# Patient Record
Sex: MALE | Race: WHITE | Employment: FULL TIME | URBAN - METROPOLITAN AREA
[De-identification: names, ages, dates, MRNs, and addresses within clinical notes are randomized per-mention and may not be internally consistent; named-entity substitution may affect disease eponyms.]

---

## 2017-01-04 ENCOUNTER — OFFICE VISIT (OUTPATIENT)
Dept: FAMILY MEDICINE | Facility: CLINIC | Age: 67
End: 2017-01-04
Payer: COMMERCIAL

## 2017-01-04 VITALS
SYSTOLIC BLOOD PRESSURE: 144 MMHG | BODY MASS INDEX: 30.24 KG/M2 | DIASTOLIC BLOOD PRESSURE: 86 MMHG | HEIGHT: 74 IN | TEMPERATURE: 97.7 F | WEIGHT: 235.6 LBS | OXYGEN SATURATION: 100 % | HEART RATE: 62 BPM

## 2017-01-04 DIAGNOSIS — A04.72 C. DIFFICILE DIARRHEA: ICD-10-CM

## 2017-01-04 DIAGNOSIS — R73.03 PREDIABETES: ICD-10-CM

## 2017-01-04 DIAGNOSIS — K62.5 RECTAL BLEEDING: Primary | ICD-10-CM

## 2017-01-04 DIAGNOSIS — R97.20 ELEVATED PROSTATE SPECIFIC ANTIGEN (PSA): ICD-10-CM

## 2017-01-04 DIAGNOSIS — R39.11 URINARY HESITANCY: ICD-10-CM

## 2017-01-04 LAB
ERYTHROCYTE [DISTWIDTH] IN BLOOD BY AUTOMATED COUNT: 15 % (ref 10–15)
HBA1C MFR BLD: 6.3 % (ref 4.3–6)
HCT VFR BLD AUTO: 36.6 % (ref 40–53)
HGB BLD-MCNC: 12.3 G/DL (ref 13.3–17.7)
MCH RBC QN AUTO: 29.9 PG (ref 26.5–33)
MCHC RBC AUTO-ENTMCNC: 33.6 G/DL (ref 31.5–36.5)
MCV RBC AUTO: 89 FL (ref 78–100)
PLATELET # BLD AUTO: 279 10E9/L (ref 150–450)
RBC # BLD AUTO: 4.12 10E12/L (ref 4.4–5.9)
WBC # BLD AUTO: 4.9 10E9/L (ref 4–11)

## 2017-01-04 PROCEDURE — 85027 COMPLETE CBC AUTOMATED: CPT | Performed by: FAMILY MEDICINE

## 2017-01-04 PROCEDURE — 99214 OFFICE O/P EST MOD 30 MIN: CPT | Performed by: FAMILY MEDICINE

## 2017-01-04 PROCEDURE — 83036 HEMOGLOBIN GLYCOSYLATED A1C: CPT | Performed by: FAMILY MEDICINE

## 2017-01-04 PROCEDURE — G0103 PSA SCREENING: HCPCS | Performed by: FAMILY MEDICINE

## 2017-01-04 PROCEDURE — 36415 COLL VENOUS BLD VENIPUNCTURE: CPT | Performed by: FAMILY MEDICINE

## 2017-01-04 RX ORDER — TAMSULOSIN HYDROCHLORIDE 0.4 MG/1
0.4 CAPSULE ORAL DAILY
Qty: 30 CAPSULE | Refills: 1 | Status: SHIPPED | OUTPATIENT
Start: 2017-01-04 | End: 2017-02-09

## 2017-01-04 NOTE — MR AVS SNAPSHOT
After Visit Summary   1/4/2017    Juwan Butt    MRN: [id number]           Patient Information     Date Of Birth          1950        Visit Information        Provider Department      1/4/2017 4:00 PM Liliana Howard MD [facility]        Today's Diagnoses     Rectal bleeding    -  1     Need for prophylactic vaccination and inoculation against influenza         C. difficile diarrhea         Urinary hesitancy         Prediabetes           Care Instructions    Based on your medical history and these are the current health maintenance or preventive care services that you are due for (some may have been done at this visit)  Health Maintenance Due   Topic Date Due     HEPATITIS C SCREENING  12/12/1970     INFLUENZA VACCINE (SYSTEM ASSIGNED)  09/01/2016       At [facility], we strive to deliver an exceptional experience to you, every time we see you.    If you receive a survey in the mail, please send us back your thoughts. We really do value your feedback.    Your care team's suggested websites for health information:  Www.GOOM.Lifeables : Up to date and easily searchable information on multiple topics.  Www.medlineplus.gov : medication info, interactive tutorials, watch real surgeries online  Www.familydoctor.org : good info from the Academy of Family Physicians  Www.cdc.gov : public health info, travel advisories, epidemics (H1N1)  Www.aap.org : children's health info, normal development, vaccinations  Www.health.state[place] : [place] dept of health, public health issues in [place], N1N1    How to contact your care team:   Team Luci/Spirit [phone]         Pharmacy [phone]    Dr. Rossi, Brenda Casillas PA-C, Tessa Licona APRN CNP, Batsheva De La Torre PA-C, Dr. Patton, and Gail Rausch, YULIYA    Team RNs: Cecille & Norma      Clinic hours  M-Th 7 am-7 pm   Fri 7 am-5 pm.   Urgent care M-F 11 am-9 pm,   Sat/Sun 9 am-5  "pm.  Pharmacy M-Th 8 am-8 pm Fri 8 am-6 pm  Sat/Sun 9 am-5 pm.     All password changes, disabled accounts, or ID changes in Mailbox/MyHealth will be done by our Access Services Department.    If you need help with your account or password, call: [phone]. Clinic staff no longer has the ability to change passwords.             Follow-ups after your visit        Your next 10 appointments already scheduled     Burton 10, 2017  4:00 PM   (Arrive by 3:45 PM)   Return Visit with Jaron Alan MD   Colon and Rectal Surgery ([facility])    [address]  [address] [facility] [address] [phone]              Who to contact     If you have questions or need follow up information about today's clinic visit or your schedule please contact [facility] CHRISTINE directly at [phone].  Normal or non-critical lab and imaging results will be communicated to you by Neuravihart, letter or phone within 4 business days after the clinic has received the results. If you do not hear from us within 7 days, please contact the clinic through [facility] or phone. If you have a critical or abnormal lab result, we will notify you by phone as soon as possible.  Submit refill requests through Mailbox or call your pharmacy and they will forward the refill request to us. Please allow 3 business days for your refill to be completed.          Additional Information About Your Visit        Mailbox Information     Mailbox lets you send messages to your doctor, view your test results, renew your prescriptions, schedule appointments and more. To sign up, go to [email or web address] . Click on \"Log in\" on the left side of the screen, which will take you to the Welcome page. Then click on \"Sign up Now\" on the right side of the page.     You will be asked to enter the access code listed below, as well as some personal information. Please follow the directions to create your username and password.   " "  Your access code is: [email or web address]  Expires: 2017  9:05 AM     Your access code will  in 90 days. If you need help or a new code, please call your [facility] or [phone].        Care EveryWhere ID     This is your Care EveryWhere ID. This could be used by other organizations to access your [facility] medical records  [id number]        Your Vitals Were     Pulse Temperature Height BMI (Body Mass Index) Pulse Oximetry       62 97.7  F (36.5  C) (Oral) 6' 1.62\" (1.87 m) 30.56 kg/m2 100%        Blood Pressure from Last 3 Encounters:   17 144/86   16 119/56   16 126/84    Weight from Last 3 Encounters:   17 235 lb 9.6 oz (106.867 kg)   16 221 lb (100.245 kg)   16 235 lb 4.8 oz (106.731 kg)              We Performed the Following     CBC with platelets     Hemoglobin A1c     PSA, screen          Today's Medication Changes          These changes are accurate as of: 17  4:21 PM.  If you have any questions, ask your nurse or doctor.               Start taking these medicines.        Dose/Directions    tamsulosin 0.4 MG capsule   Commonly known as:  FLOMAX   Used for:  Urinary hesitancy   Started by:  Liliana Howard MD        Dose:  0.4 mg   Take 1 capsule (0.4 mg) by mouth daily   Quantity:  30 capsule   Refills:  1            Where to get your medicines      These medications were sent to [facility] [id number] [place], [place] - [address]  [address] [facility] [address]     Phone:  [phone]    - tamsulosin 0.4 MG capsule             Primary Care Provider Office Phone # Fax #    Liliana Sam [phone]       [facility] [address]  [facility] [address]        Thank you!     Thank you for choosing [facility]  for your care. Our goal is always to provide you with excellent care. Hearing back from our patients is one way we can continue to " improve our services. Please take a few minutes to complete the written survey that you may receive in the mail after your visit with us. Thank you!             Your Updated Medication List - Protect others around you: Learn how to safely use, store and throw away your medicines at www.disposemymeds.org.          This list is accurate as of: 1/4/17  4:21 PM.  Always use your most recent med list.                   Brand Name Dispense Instructions for use    bacitracin ointment     113.4 g    Apply topically 2 times daily       metroNIDAZOLE 500 MG tablet    FLAGYL    40 tablet    Take 1 tablet (500 mg) by mouth every 8 hours Take first pill at 9PM on 12/21/2016.       tamsulosin 0.4 MG capsule    FLOMAX    30 capsule    Take 1 capsule (0.4 mg) by mouth daily

## 2017-01-04 NOTE — PATIENT INSTRUCTIONS
Based on your medical history and these are the current health maintenance or preventive care services that you are due for (some may have been done at this visit)  Health Maintenance Due   Topic Date Due     HEPATITIS C SCREENING  12/12/1970     INFLUENZA VACCINE (SYSTEM ASSIGNED)  09/01/2016       At [facility], we strive to deliver an exceptional experience to you, every time we see you.    If you receive a survey in the mail, please send us back your thoughts. We really do value your feedback.    Your care team's suggested websites for health information:  Www.[facility]and.org : Up to date and easily searchable information on multiple topics.  Www.medlineplus.gov : medication info, interactive tutorials, watch real surgeries online  Www.familydoctor.org : good info from the Academy of Family Physicians  Www.cdc.gov : public health info, travel advisories, epidemics (H1N1)  Www.aap.org : children's health info, normal development, vaccinations  Www.health.[facility].[place].us : [place] dept of health, public health issues in [place], N1N1    How to contact your care team:   Stefanie Verma/Pato [phone]         Pharmacy [phone]    Dr. Rossi, Brenda Casillas PA-C, Dr. Healy, Tessa CRISTINA CNP, Batsheva De La Torre PA-C, Dr. Patton, and Gail Rausch CNP    Team RNs: Cecille & Norma      Clinic hours  M-Th 7 am-7 pm   Fri 7 am-5 pm.   Urgent care M-F 11 am-9 pm,   Sat/Sun 9 am-5 pm.  Pharmacy M-Th 8 am-8 pm Fri 8 am-6 pm  Sat/Sun 9 am-5 pm.     All password changes, disabled accounts, or ID changes in Doostang/MyHealth will be done by our Access Services Department.    If you need help with your account or password, call: [phone]. Clinic staff no longer has the ability to change passwords.

## 2017-01-04 NOTE — PROGRESS NOTES
SUBJECTIVE:                                                    Juwan Butt is a 66 year old male who presents to clinic today for the following health issues:          Hospital Follow-up Visit:    Hospital/Nursing Home/IP Rehab Facility: [facility] transfered to [facility]  Date of Admission: 12/19/2016  Date of Discharge: 12/21/2016  Reason(s) for Admission: Rectal Bleeding             Problems taking medications regularly:  None       Medication changes since discharge: None       Problems adhering to non-medication therapy:  None    Summary of hospitalization:  CareEverywhere information obtained and reviewed  Diagnostic Tests/Treatments reviewed.  Follow up needed: none  Other Healthcare Providers Involved in Patient s Care:         None  Update since discharge: improved.     Post Discharge Medication Reconciliation: discharge medications reconciled, continue medications without change.  Plan of care communicated with patient     Coding guidelines for this visit:  Type of Medical   Decision Making Face-to-Face Visit       within 7 Days of discharge Face-to-Face Visit        within 14 days of discharge   Moderate Complexity [id number]   High Complexity [id number]          12/7/16 had rectal polyp removed and was non cancerous.  Has had trouble urinating since then.  12/18/16 had BM with large amount of blood.  This continue hourly and then he passed out and hit face on fair with cut on right check.  Woke and passed out again.  Had another bloody BM and passed out for the third time.  Felt better and showered and then was better.  911 was called and taken to [facility].  Thought this was related to rectal procedure and was reassured.  Moved to [facility] and diarrhea began again and diagnosed with C Dif.  He was placed on metronidazole and last dose was today.    History of prediabetes - has not been watching diet closely given the holidays.    Problem list and histories reviewed &  "adjusted, as indicated.  Additional history: as documented    Problem list, Medication list, Allergies, and Medical/Social/Surgical histories reviewed in [facility] and updated as appropriate.    ROS:  Constitutional, HEENT, cardiovascular, pulmonary, gi and gu systems are negative, except as otherwise noted.    OBJECTIVE:                                                    /86 mmHg  Pulse 62  Temp(Src) 97.7  F (36.5  C) (Oral)  Ht 6' 1.62\" (1.87 m)  Wt 235 lb 9.6 oz (106.867 kg)  BMI 30.56 kg/m2  SpO2 100%  Body mass index is 30.56 kg/(m^2).  GENERAL: healthy, alert and no distress  NECK: no adenopathy, no asymmetry, masses, or scars and thyroid normal to palpation  RESP: lungs clear to auscultation - no rales, rhonchi or wheezes  CV: regular rate and rhythm, normal S1 S2, no S3 or S4, no murmur, click or rub, no peripheral edema and peripheral pulses strong  ABDOMEN: soft, nontender, no hepatosplenomegaly, no masses and bowel sounds normal  MS: no gross musculoskeletal defects noted, no edema    Diagnostic Test Results:  Results for orders placed or performed in visit on 01/04/17 (from the past 24 hour(s))   Hemoglobin A1c   Result Value Ref Range    Hemoglobin A1C 6.3 (H) 4.3 - 6.0 %   CBC with platelets   Result Value Ref Range    WBC 4.9 4.0 - 11.0 10e9/L    RBC Count 4.12 (L) 4.4 - 5.9 10e12/L    Hemoglobin 12.3 (L) 13.3 - 17.7 g/dL    Hematocrit 36.6 (L) 40.0 - 53.0 %    MCV 89 78 - 100 fl    MCH 29.9 26.5 - 33.0 pg    MCHC 33.6 31.5 - 36.5 g/dL    RDW 15.0 10.0 - 15.0 %    Platelet Count 279 150 - 450 10e9/L        ASSESSMENT/PLAN:                                                    1. Rectal bleeding  Resolved and anemia improved  - CBC with platelets    2. C. difficile diarrhea  Resolved - monitor for more diarrhea    3. Urinary hesitancy  Uncertain etiology - check PSA and trial of flomax for 2 weeks.  Follow up if symptoms not improved or if they recur.  - PSA, screen  - tamsulosin (FLOMAX) 0.4 MG " capsule; Take 1 capsule (0.4 mg) by mouth daily  Dispense: 30 capsule; Refill: 1    4. Prediabetes  Stable - low carb diet again discussed.  - Hemoglobin A1c    The uses and side effects, including black box warnings as appropriate, were discussed in detail.  All patient questions were answered.  The patient was instructed to call immediately if any side effects developed.     Liliana Sam MD  [facility]

## 2017-01-04 NOTE — NURSING NOTE
"Chief Complaint   Patient presents with     Hospital F/U       Initial /88 mmHg  Pulse 48  Temp(Src) 97.7  F (36.5  C) (Oral)  Ht 6' 1.62\" (1.87 m)  Wt 235 lb 9.6 oz (106.867 kg)  BMI 30.56 kg/m2  SpO2 100% Estimated body mass index is 30.56 kg/(m^2) as calculated from the following:    Height as of this encounter: 6' 1.62\" (1.87 m).    Weight as of this encounter: 235 lb 9.6 oz (106.867 kg).  BP completed using cuff size: philip Abernathy MA      "

## 2017-01-05 LAB — PSA SERPL-ACNC: 4.74 UG/L (ref 0–4)

## 2017-01-06 ENCOUNTER — TELEPHONE (OUTPATIENT)
Dept: FAMILY MEDICINE | Facility: CLINIC | Age: 67
End: 2017-01-06

## 2017-01-06 PROBLEM — R97.20 ELEVATED PROSTATE SPECIFIC ANTIGEN (PSA): Status: ACTIVE | Noted: 2017-01-06

## 2017-01-06 NOTE — TELEPHONE ENCOUNTER
Notes Recorded by Liliana Howard MD on 1/6/2017 at 2:32 PM  Please call.    Prostate blood test is slightly high. Please use the flomax given at his appt and follow up in 1 month for recheck of this lab.  He is still prediabetic based on his labs so work on cutting back in bread, sweets and beer consistently.  Hemoglobin is improving.    Liliana oRssi M.D.    This writer attempted to contact Juwan on 01/06/2017.    Was call answered?  No.  Unable to leave message.    If patient calls back, please Contact Clinic RN team. If no one available, send encounter message    Cecille Sanabria RN

## 2017-01-06 NOTE — PROGRESS NOTES
Quick Note:    Please call.    Prostate blood test is slightly high. Please use the flomax given at his appt and follow up in 1 month for recheck of this lab.  He is still prediabetic based on his labs so work on cutting back in bread, sweets and beer consistently.  Hemoglobin is improving.    Liliana Rossi M.D.  ______

## 2017-01-09 NOTE — TELEPHONE ENCOUNTER
Patient was told to take for 2 weeks then stop it.  If symptoms are not improved while taking and while off the medication after he stops it, let me know.

## 2017-01-09 NOTE — TELEPHONE ENCOUNTER
Patient called back and was informed of the information below.    Norma Saunders RN, [facility] Triage

## 2017-01-09 NOTE — TELEPHONE ENCOUNTER
Called the patient with the information below. Is he to take the flomax for 2 weeks only and then recheck it in another 2 weeks? Clarification needed on how to proceed.    Norma Saunders RN, [facility] Triage

## 2017-01-09 NOTE — TELEPHONE ENCOUNTER
This writer attempted to contact Juwan on 01/09/2017.    Was call answered?  No.  Left message on voicemail with information to call me back.    If patient calls back, please Contact Clinic RN team. If no one available, send encounter message.    Norma Saunders RN

## 2017-02-08 ENCOUNTER — TELEPHONE (OUTPATIENT)
Dept: FAMILY MEDICINE | Facility: CLINIC | Age: 67
End: 2017-02-08

## 2017-02-08 DIAGNOSIS — N52.8 OTHER MALE ERECTILE DYSFUNCTION: Primary | ICD-10-CM

## 2017-02-08 NOTE — TELEPHONE ENCOUNTER
"..Reason for Call:  Pt calling to update after taking flomax for two weeks    Detailed comments: the urinary problem is pretty much resolved, states,  \"I'm as good as I was before the procedure\"  The ED problem is not resolved, asking for a medication to help with this.      [facility] -[phone]    Phone Number Patient can be reached at: Cell number on file:    Telephone Information:   Mobile [phone]       Best Time: anytime    Can we leave a detailed message on this number? YES    Call taken on 2/8/2017 at 2:25 PM by Josefa Rouse      "

## 2017-02-09 PROBLEM — N52.8 OTHER MALE ERECTILE DYSFUNCTION: Status: ACTIVE | Noted: 2017-02-09

## 2017-02-09 RX ORDER — SILDENAFIL 50 MG/1
25-50 TABLET, FILM COATED ORAL DAILY PRN
Qty: 6 TABLET | Refills: 1 | Status: SHIPPED
Start: 2017-02-09 | End: 2017-07-25

## 2017-02-09 NOTE — TELEPHONE ENCOUNTER
Great to hear things are better with the urinary problem.  Dr. Rossi is out of the office until Monday, I'll forward this message along to her to address the ED problem upon her return.     Batsheva De La Torre PA-C

## 2017-02-23 ENCOUNTER — OFFICE VISIT (OUTPATIENT)
Dept: URGENT CARE | Facility: URGENT CARE | Age: 67
End: 2017-02-23
Payer: COMMERCIAL

## 2017-02-23 VITALS
BODY MASS INDEX: 29.19 KG/M2 | HEART RATE: 51 BPM | OXYGEN SATURATION: 97 % | SYSTOLIC BLOOD PRESSURE: 190 MMHG | WEIGHT: 225 LBS | DIASTOLIC BLOOD PRESSURE: 90 MMHG | TEMPERATURE: 97 F

## 2017-02-23 DIAGNOSIS — H60.391 OTHER INFECTIVE ACUTE OTITIS EXTERNA OF RIGHT EAR: Primary | ICD-10-CM

## 2017-02-23 DIAGNOSIS — H92.01 RIGHT EAR PAIN: ICD-10-CM

## 2017-02-23 PROCEDURE — 99213 OFFICE O/P EST LOW 20 MIN: CPT | Performed by: PHYSICIAN ASSISTANT

## 2017-02-23 RX ORDER — CIPROFLOXACIN AND DEXAMETHASONE 3; 1 MG/ML; MG/ML
4 SUSPENSION/ DROPS AURICULAR (OTIC) 2 TIMES DAILY
Qty: 7.5 ML | Refills: 0 | Status: SHIPPED | OUTPATIENT
Start: 2017-02-23 | End: 2017-03-02

## 2017-02-23 NOTE — PROGRESS NOTES
"SUBJECTIVE:    Juwan Butt presents to  today for evaluation of right ear pain x 24 hours. Patient notes onset of pain came after wife helped him remove cerumen out of right ear by use of and ear \"candle\" and by using a hair pin/farrah pin.     Denies any fever, nasal congestion, cough or any other acute URI sxs.     CV: Elevated BP of 190/90 noted here today.  Patient states he has a longstanding hx of having similar readings that have been dx as white coat HTN. Review of Epic confirms same. He denies any CP, SOB, headache or other CV sxs.     Past Medical History   Diagnosis Date     First degree AV block 1/6/2016     Hypertension      white coat syndrome     Rectal polyp        Current Outpatient Prescriptions:      sildenafil (REVATIO/VIAGRA) 50 MG cap/tab, Take 0.5-1 tablets (25-50 mg) by mouth daily as needed for erectile dysfunction Take 30 min to 4 hours before intercourse.  Never use with nitroglycerin, terazosin or doxazosin. (Patient not taking: Reported on 2/23/2017), Disp: 6 tablet, Rfl: 1     bacitracin ointment, Apply topically 2 times daily (Patient not taking: Reported on 2/23/2017), Disp: 113.4 g, Rfl: 3     metroNIDAZOLE (FLAGYL) 500 MG tablet, Take 1 tablet (500 mg) by mouth every 8 hours Take first pill at 9PM on 12/21/2016. (Patient not taking: Reported on 2/23/2017), Disp: 40 tablet, Rfl: 0  No current facility-administered medications for this visit.     Facility-Administered Medications Ordered in Other Visits:      ceFAZolin 1000 mg in NaCl 1000 mL, 1 g, , PRN, Tom Odom MD, 1 g at 01/13/16 1100    No Known Allergies        OBJECTIVE:  /90 (BP Location: Right arm, Patient Position: Chair, Cuff Size: Adult Large)  Pulse 51  Temp 97  F (36.1  C) (Tympanic)  Wt 225 lb (102.1 kg)  SpO2 97%  BMI 29.19 kg/m2      General appearance: alert and no apparent distress  Skin color is pink and without rash.  HEENT:   Conjunctiva not injected.  Sclera clear.  Left TM is normal: no " "effusions, no erythema, and normal landmarks.  Right external canal is erythematous, moderately edematous and filled with purulent debris. I am unable to see TM today  Nasal mucosa is normal.  Oropharyngeal exam is normal: no lesions, erythema, adenopathy or exudate.  Neck is supple with no adenopathy  CARDIAC:NORMAL - regular rate and rhythm without murmur.  RESP: Normal - CTA without rales, rhonchi, or wheezing.      ASSESSMENT/PLAN:      (H60.391) Other infective acute otitis externa of right ear  (primary encounter diagnosis)  Plan: ciprofloxacin-dexamethasone (CIPRODEX) otic         suspension  Abx as per above.      Home comfort care measures, expected course of illness, expected response to treatment provided here today and red flag signs and sxs all discussed. Follow-up with PCP if sxs change, worsen or fail to fully resolve with above tx. In addition to the above, otitis externa \"red flag\" signs and sxs are reviewed with pt both verbally and by way of printed educational material for home review.  Pt verbalizes understanding of and agrees to the above plan.         (H92.01) Right ear pain  Plan: as per above     (I10) Elevated BP  Comment: Patient states today's reading is typical for him in clinic setting   Plan: Continue to follow-up with PCP as directed regarding significantly elevated BP readings in office setting.         "

## 2017-02-23 NOTE — PATIENT INSTRUCTIONS
External Ear Infection (Adult)    External otitis (also called  swimmer s ear ) is an infection in the ear canal. It is often caused by bacteria or fungus. It can occur a few days after water gets trapped in the ear canal (from swimming or bathing). It can also occur after cleaning too deeply in the ear canal with a cotton swab or other object. Sometimes, hair care products get into the ear canal and cause this problem.  Symptoms can include pain, fever, itching, redness, drainage, or swelling of the ear canal. Temporary hearing loss may also occur.  Home care    Do not try to clean the ear canal. This can push pus and bacteria deeper into the canal.    Use prescribed ear drops as directed. These help reduce swelling and fight the infection. If an ear wick was placed in the ear canal, apply drops right onto the end of the wick. The wick will draw the medication into the ear canal even if it is swollen closed.    A cotton ball may be loosely placed in the outer ear to absorb any drainage.    You may use acetaminophen or ibuprofen to control pain, unless another medication was prescribed. Note: If you have chronic liver or kidney disease or ever had a stomach ulcer or GI bleeding, talk to your health care provider before taking any of these medications.    Do not allow water to get into your ear when bathing. Also, avoid swimming until the infection has cleared.  Prevention    Keep your ears dry. This helps lower the risk of infection. Dry your ears with a towel or hair dryer after getting wet. Also, use ear plugs when swimming.    Do not stick any objects in the ear to remove wax.    If you feel water trapped in your ear, use ear drops right away. You can get these drops over the counter at most drugstores.  They work by removing water from the ear canal.  Follow-up care  Follow up with your health care provider in one week, or as advised.   When to seek medical advice  Call your health care provider right away if  any of these occur:    Ear pain becomes worse or doesn t improve after 3 days of treatment    Redness or swelling of the outer ear occurs or gets worse    Headache    Painful or stiff neck    Drowsiness or confusion    Fever of 100.4 F (38 C) or higher, or as directed by your health care provider    Seizure        9057-7525 [employer]. [address] [place] [address]. All rights reserved. This information is not intended as a substitute for professional medical care. Always follow your healthcare professional's instructions.

## 2017-02-23 NOTE — MR AVS SNAPSHOT
After Visit Summary   2/23/2017    Juwan Butt    MRN: [id number]           Patient Information     Date Of Birth          1950        Visit Information        Provider Department      2/23/2017 12:00 PM Prasanna Carmichael PA-C [facility]        Today's Diagnoses     Other infective acute otitis externa of right ear    -  1    Right ear pain        Elevated BP          Care Instructions      External Ear Infection (Adult)    External otitis (also called  swimmer s ear ) is an infection in the ear canal. It is often caused by bacteria or fungus. It can occur a few days after water gets trapped in the ear canal (from swimming or bathing). It can also occur after cleaning too deeply in the ear canal with a cotton swab or other object. Sometimes, hair care products get into the ear canal and cause this problem.  Symptoms can include pain, fever, itching, redness, drainage, or swelling of the ear canal. Temporary hearing loss may also occur.  Home care    Do not try to clean the ear canal. This can push pus and bacteria deeper into the canal.    Use prescribed ear drops as directed. These help reduce swelling and fight the infection. If an ear wick was placed in the ear canal, apply drops right onto the end of the wick. The wick will draw the medication into the ear canal even if it is swollen closed.    A cotton ball may be loosely placed in the outer ear to absorb any drainage.    You may use acetaminophen or ibuprofen to control pain, unless another medication was prescribed. Note: If you have chronic liver or kidney disease or ever had a stomach ulcer or GI bleeding, talk to your health care provider before taking any of these medications.    Do not allow water to get into your ear when bathing. Also, avoid swimming until the infection has cleared.  Prevention    Keep your ears dry. This helps lower the risk of infection. Dry your ears with a towel or hair dryer  after getting wet. Also, use ear plugs when swimming.    Do not stick any objects in the ear to remove wax.    If you feel water trapped in your ear, use ear drops right away. You can get these drops over the counter at most drugstores.  They work by removing water from the ear canal.  Follow-up care  Follow up with your health care provider in one week, or as advised.   When to seek medical advice  Call your health care provider right away if any of these occur:    Ear pain becomes worse or doesn t improve after 3 days of treatment    Redness or swelling of the outer ear occurs or gets worse    Headache    Painful or stiff neck    Drowsiness or confusion    Fever of 100.4 F (38 C) or higher, or as directed by your health care provider    Seizure        2756-7835 [employer]. [address] [place] [address]. All rights reserved. This information is not intended as a substitute for professional medical care. Always follow your healthcare professional's instructions.              Follow-ups after your visit        Who to contact     If you have questions or need follow up information about today's clinic visit or your schedule please contact [facility] directly at [phone].  Normal or non-critical lab and imaging results will be communicated to you by Greysoxhart, letter or phone within 4 business days after the clinic has received the results. If you do not hear from us within 7 days, please contact the clinic through Greysoxhart or phone. If you have a critical or abnormal lab result, we will notify you by phone as soon as possible.  Submit refill requests through Vdancer or call your pharmacy and they will forward the refill request to us. Please allow 3 business days for your refill to be completed.          Additional Information About Your Visit        Greysoxhart Information     Vdancer lets you send messages to your doctor, view your test results, renew your prescriptions,  "schedule appointments and more. To sign up, go to [email or web address] . Click on \"Log in\" on the left side of the screen, which will take you to the Welcome page. Then click on \"Sign up Now\" on the right side of the page.     You will be asked to enter the access code listed below, as well as some personal information. Please follow the directions to create your username and password.     Your access code is: [email or web address]  Expires: 2017 12:17 PM     Your access code will  in 90 days. If you need help or a new code, please call your [facility] clinic or [phone].        Care EveryWhere ID     This is your Care EveryWhere ID. This could be used by other organizations to access your [facility] medical records  [id number]        Your Vitals Were     Pulse Temperature Pulse Oximetry BMI (Body Mass Index)          51 97  F (36.1  C) (Tympanic) 97% 29.19 kg/m2         Blood Pressure from Last 3 Encounters:   17 190/90   17 144/86   16 119/56    Weight from Last 3 Encounters:   17 225 lb (102.1 kg)   17 235 lb 9.6 oz (106.9 kg)   16 221 lb (100.2 kg)              Today, you had the following     No orders found for display         Today's Medication Changes          These changes are accurate as of: 17 12:18 PM.  If you have any questions, ask your nurse or doctor.               Start taking these medicines.        Dose/Directions    ciprofloxacin-dexamethasone otic suspension   Commonly known as:  CIPRODEX   Used for:  Other infective acute otitis externa of right ear   Started by:  Prasanna Carmichael PA-C        Dose:  4 drop   Place 4 drops into the right ear 2 times daily for 7 days   Quantity:  7.5 mL   Refills:  0            Where to get your medicines      These medications were sent to [facility] - [address]   [address]  [address], [place] [address]     Phone:  [phone]     " ciprofloxacin-dexamethasone otic suspension                Primary Care Provider Office Phone # Fax #    Liliana Ingris Sam [phone]       [facility] [address]  [facility] [address]        Thank you!     Thank you for choosing [facility]  for your care. Our goal is always to provide you with excellent care. Hearing back from our patients is one way we can continue to improve our services. Please take a few minutes to complete the written survey that you may receive in the mail after your visit with us. Thank you!             Your Updated Medication List - Protect others around you: Learn how to safely use, store and throw away your medicines at www.disposemymeds.org.          This list is accurate as of: 2/23/17 12:18 PM.  Always use your most recent med list.                   Brand Name Dispense Instructions for use    bacitracin ointment     113.4 g    Apply topically 2 times daily       ciprofloxacin-dexamethasone otic suspension    CIPRODEX    7.5 mL    Place 4 drops into the right ear 2 times daily for 7 days       metroNIDAZOLE 500 MG tablet    FLAGYL    40 tablet    Take 1 tablet (500 mg) by mouth every 8 hours Take first pill at 9PM on 12/21/2016.       sildenafil 50 MG cap/tab    REVATIO/VIAGRA    6 tablet    Take 0.5-1 tablets (25-50 mg) by mouth daily as needed for erectile dysfunction Take 30 min to 4 hours before intercourse.  Never use with nitroglycerin, terazosin or doxazosin.

## 2017-07-25 ENCOUNTER — OFFICE VISIT (OUTPATIENT)
Dept: SURGERY | Facility: CLINIC | Age: 67
End: 2017-07-25

## 2017-07-25 VITALS
WEIGHT: 239.9 LBS | SYSTOLIC BLOOD PRESSURE: 161 MMHG | OXYGEN SATURATION: 96 % | TEMPERATURE: 98 F | HEART RATE: 47 BPM | HEIGHT: 73 IN | DIASTOLIC BLOOD PRESSURE: 89 MMHG | BODY MASS INDEX: 31.8 KG/M2

## 2017-07-25 DIAGNOSIS — K62.5 RECTAL BLEEDING: Primary | ICD-10-CM

## 2017-07-25 DIAGNOSIS — K62.1 RECTAL POLYP: ICD-10-CM

## 2017-07-25 ASSESSMENT — PAIN SCALES - GENERAL: PAINLEVEL: NO PAIN (0)

## 2017-07-25 NOTE — MR AVS SNAPSHOT
After Visit Summary   7/25/2017    Juwan Butt    MRN: [id number]           Patient Information     Date Of Birth          1950        Visit Information        Provider Department      7/25/2017 3:00 PM Jaron Alan MD [facility] Colon and Rectal Surgery        Today's Diagnoses     Rectal bleeding    -  1    Rectal polyp          Care Instructions    RFV: Routine follow up of tubulovillous adenoma s/p TAMIS    HPI: Mr. 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  He has been doing well in the interim.  No changes in bowel habits.  No rectal bleeding or rectal pain.  No abdominal pain or weight loss.      PE:  AF VSS  Awake and alert  RRR  CTA b/l  Soft, non tender, non distended, no HSM  Perianal skin: no fissure or fistulas, dry perineum, no excoriation, no masses  CHRIS: Normal tone, slightly enlarged prostate, no masses  No c/c/e    Flex Sigmoidoscopy: After informed consent was obtained the flexible sigmoidoscope was introduced via the anus.  This was advanced to 30 cm.  The sigmoid and proximal rectum were normal.  There was a healed scar in the distal rectum corresponding to the site of the resection.  There was a small lesion at the periphery of the scar.  This was biopsied with a cold forceps.  Hemostasis was excellent. Retroflexion was not performed.  The anal canal was normal.    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 Alan              Follow-ups after your visit        Who to contact     Please call your clinic at [phone] to:    Ask questions about your health    Make or cancel appointments    Discuss your medicines    Learn about your test  "results    Speak to your doctor   If you have compliments or concerns about an experience at your clinic, or if you wish to file a complaint, please contact [facility] Patient Relations at [phone] or email us at [email or web address]         Additional Information About Your Visit        [employer] International[employer] Information     Hele Mass[employer] is an electronic gateway that provides easy, online access to your medical records. With Ubersense, you can request a clinic appointment, read your test results, renew a prescription or communicate with your care team.     To sign up for U[employer]ense visit the website at [email or web address]   You will be asked to enter the access code listed below, as well as some personal information. Please follow the directions to create your username and password.     Your access code is: [email or web address]  Expires: 2017  3:03 PM     Your access code will  in 90 days. If you need help or a new code, please contact your [facility] or call [phone] for assistance.        Care EveryWhere ID     This is your Care EveryWhere ID. This could be used by other organizations to access your [facility] medical records  [id number]        Your Vitals Were     Pulse Temperature Height Pulse Oximetry BMI (Body Mass Index)       47 98  F (36.7  C) (Oral) 1.854 m (6' 1\") 96% 31.65 kg/m2        Blood Pressure from Last 3 Encounters:   17 161/89   17 190/90   17 144/86    Weight from Last 3 Encounters:   17 108.8 kg (239 lb 14.4 oz)   17 102.1 kg (225 lb)   17 106.9 kg (235 lb 9.6 oz)              We Performed the Following     Surgical pathology exam        Primary Care Provider Office Phone # Fax #    Liliana Sam [phone]       [facility] [address]  [facility] [address]        Equal Access to Services     DARIUSZ PEREZ AH: Citlalli Cadena, " martha chaudhry, berenicemaritza diasbrian francagwendolyn, luis manuel pakoin hayaan candacegene frankiebang laGabieleazar ah. So [facility] [phone].    ATENCIÓN: Si habla español, tiene a barnes disposición servicios gratuitos de asistencia lingüística. Llame al [phone].    We comply with applicable federal civil rights laws and [place] laws. We do not discriminate on the basis of race, color, national origin, age, disability sex, sexual orientation or gender identity.            Thank you!     Thank you for choosing [facility] COLON AND RECTAL SURGERY  for your care. Our goal is always to provide you with excellent care. Hearing back from our patients is one way we can continue to improve our services. Please take a few minutes to complete the written survey that you may receive in the mail after your visit with us. Thank you!             Your Updated Medication List - Protect others around you: Learn how to safely use, store and throw away your medicines at www.disposemymeds.org.      Notice  As of 7/25/2017 11:59 PM    You have not been prescribed any medications.

## 2017-07-25 NOTE — NURSING NOTE
"Chief Complaint   Patient presents with     Clinic Care Coordination - Follow-up     Flexible sigmoidoscopy.       Vitals:    07/25/17 1457   BP: 161/89   BP Location: Left arm   Patient Position: Chair   Cuff Size: Adult Large   Pulse: (!) 47   Temp: 98  F (36.7  C)   TempSrc: Oral   SpO2: 96%   Weight: 239 lb 14.4 oz   Height: 6' 1\"       Body mass index is 31.65 kg/(m^2).    Azar ALFARO LPN                     "

## 2017-07-25 NOTE — NURSING NOTE
RFV: Routine follow up of tubulovillous adenoma s/p TAMIS    HPI: Mr. 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  He has been doing well in the interim.  No changes in bowel habits.  No rectal bleeding or rectal pain.  No abdominal pain or weight loss.      PE:  AF VSS  Awake and alert  RRR  CTA b/l  Soft, non tender, non distended, no HSM  Perianal skin: no fissure or fistulas, dry perineum, no excoriation, no masses  CHRIS: Normal tone, slightly enlarged prostate, no masses  No c/c/e    Flex Sigmoidoscopy: After informed consent was obtained the flexible sigmoidoscope was introduced via the anus.  This was advanced to 30 cm.  The sigmoid and proximal rectum were normal.  There was a healed scar in the distal rectum corresponding to the site of the resection.  There was a small lesion at the periphery of the scar.  This was biopsied with a cold forceps.  Hemostasis was excellent. Retroflexion was not performed.  The anal canal was normal.    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 Alan

## 2017-07-25 NOTE — LETTER
7/25/2017       RE: Juwan Butt  [address] [place] [address]     Dear Colleague,    Thank you for referring your patient, Juwan Butt, to the [facility] COLON AND RECTAL SURGERY at [facility]. Please see a copy of my visit note below.    The following is my clinic note of 7/25/2017, incorrectly filed by Epic under nursing note:    RFV: Routine follow up of tubulovillous adenoma s/p TAMIS     HPI: Mr. 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  He has been doing well in the interim.  No changes in bowel habits.  No rectal bleeding or rectal pain.  No abdominal pain or weight loss.       PE:  AF VSS  Awake and alert  RRR  CTA b/l  Soft, non tender, non distended, no HSM  Perianal skin: no fissure or fistulas, dry perineum, no excoriation, no masses  CHRIS: Normal tone, slightly enlarged prostate, no masses  No c/c/e     Flex Sigmoidoscopy: After informed consent was obtained the flexible sigmoidoscope was introduced via the anus.  This was advanced to 30 cm.  The sigmoid and proximal rectum were normal.  There was a healed scar in the distal rectum corresponding to the site of the resection.  There was a small lesion at the periphery of the scar.  This was biopsied with a cold forceps.  Hemostasis was excellent. Retroflexion was not performed.  The anal canal was normal.     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 Alan         Electronically signed by Timoteo Peters MD at 7/25/2017  5:48 PM     I saw and examined the patient, led the discussion and edited the resident note.  I agree with the assessment and plan as  outlined.    Jaron Alan MD  Professor of Surgery

## 2017-07-25 NOTE — LETTER
"August 28, 2017    Juwan Butt  [address] [place] [address]    Dear Juwan,    Your biopsy from your flexible sigmoidoscopy procedure came back negative for any malignancy. Dr. Mccall would like you to come back into the office in 6 months for another flexible sigmoidoscopy procedure.     Let us know if you have any questions.     Sincerely,    Jhonny Malave LPN  Colon and Rectal Surgery Clinic  [facility]    Resulted Orders   Surgical pathology exam   Result Value Ref Range    Copath Report       Patient Name: JUWAN BUTT  MR#: [id number]  Specimen #: [id number]  Collected: 7/25/2017  Received: 7/25/2017  Reported: 7/26/2017 12:32  Ordering Phy(s): DONALD MCCALL    For improved result formatting, select 'View Enhanced Report Format'  under Linked Documents section.    SPECIMEN(S):  Rectal polyp    FINAL DIAGNOSIS:  RECTAL POLYP, POLYPECTOMY:  - Inflammatory polyp    I have personally reviewed all specimens and or slides, including the  listed special stains, and used them with my medical judgement to  determine the final diagnosis.    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  Per note from 7/25/17 encounter, flexible sigmoidoscopy performed  identified healed scar with a small lesion at periphery of scar.    GROSS:  The specimen is received in formalin with proper patient identification,  label ed \"rectal polyp\", and consists of one pink-tan soft tissue  fragment, 0.2 cm in greatest dimension.  The specimen is wrapped and  entirely submitted in one cassette. (Dictated by: Kathy Rucker 7/25/2017  04:10 PM)    MICROSCOPIC:  Microscopic examination is performed.    CPT Codes:  A: 48952-YZ0    TESTING LAB LOCATION:  [facility] [address] [place]   [id number] [phone]    COLLECTION SITE:  Client: [facility] [place]  Location: [facility] ([facility])

## 2017-07-26 LAB — COPATH REPORT: NORMAL

## 2017-08-07 NOTE — PROGRESS NOTES
The following is my clinic note of 7/25/2017, incorrectly filed by Epic under nursing note:    RFV: Routine follow up of tubulovillous adenoma s/p TAMIS     HPI: Mr. 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  He has been doing well in the interim.  No changes in bowel habits.  No rectal bleeding or rectal pain.  No abdominal pain or weight loss.       PE:  AF VSS  Awake and alert  RRR  CTA b/l  Soft, non tender, non distended, no HSM  Perianal skin: no fissure or fistulas, dry perineum, no excoriation, no masses  CHRIS: Normal tone, slightly enlarged prostate, no masses  No c/c/e     Flex Sigmoidoscopy: After informed consent was obtained the flexible sigmoidoscope was introduced via the anus.  This was advanced to 30 cm.  The sigmoid and proximal rectum were normal.  There was a healed scar in the distal rectum corresponding to the site of the resection.  There was a small lesion at the periphery of the scar.  This was biopsied with a cold forceps.  Hemostasis was excellent. Retroflexion was not performed.  The anal canal was normal.     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 Alan         Electronically signed by Timoteo Peters MD at 7/25/2017  5:48 PM     I saw and examined the patient, led the discussion and edited the resident note.  I agree with the assessment and plan as outlined.    Jaron Alan MD  Professor of Surgery

## 2017-08-17 ENCOUNTER — OFFICE VISIT (OUTPATIENT)
Dept: FAMILY MEDICINE | Facility: CLINIC | Age: 67
End: 2017-08-17
Payer: COMMERCIAL

## 2017-08-17 VITALS
HEART RATE: 54 BPM | DIASTOLIC BLOOD PRESSURE: 86 MMHG | SYSTOLIC BLOOD PRESSURE: 138 MMHG | OXYGEN SATURATION: 95 % | BODY MASS INDEX: 30.87 KG/M2 | WEIGHT: 234 LBS | TEMPERATURE: 96.7 F

## 2017-08-17 DIAGNOSIS — T78.40XA ALLERGIC REACTION, INITIAL ENCOUNTER: Primary | ICD-10-CM

## 2017-08-17 DIAGNOSIS — R97.20 ELEVATED PROSTATE SPECIFIC ANTIGEN (PSA): ICD-10-CM

## 2017-08-17 DIAGNOSIS — N52.8 OTHER MALE ERECTILE DYSFUNCTION: ICD-10-CM

## 2017-08-17 DIAGNOSIS — R73.03 PREDIABETES: ICD-10-CM

## 2017-08-17 DIAGNOSIS — R03.0 ELEVATED BLOOD PRESSURE READING WITHOUT DIAGNOSIS OF HYPERTENSION: ICD-10-CM

## 2017-08-17 DIAGNOSIS — R39.11 URINARY HESITANCY: ICD-10-CM

## 2017-08-17 LAB — HBA1C MFR BLD: 6.7 % (ref 4.3–6)

## 2017-08-17 PROCEDURE — 83036 HEMOGLOBIN GLYCOSYLATED A1C: CPT | Performed by: FAMILY MEDICINE

## 2017-08-17 PROCEDURE — 36415 COLL VENOUS BLD VENIPUNCTURE: CPT | Performed by: FAMILY MEDICINE

## 2017-08-17 PROCEDURE — 84154 ASSAY OF PSA FREE: CPT | Mod: 90 | Performed by: FAMILY MEDICINE

## 2017-08-17 PROCEDURE — 84153 ASSAY OF PSA TOTAL: CPT | Mod: 90 | Performed by: FAMILY MEDICINE

## 2017-08-17 PROCEDURE — 99000 SPECIMEN HANDLING OFFICE-LAB: CPT | Performed by: FAMILY MEDICINE

## 2017-08-17 PROCEDURE — 99214 OFFICE O/P EST MOD 30 MIN: CPT | Performed by: FAMILY MEDICINE

## 2017-08-17 RX ORDER — SILDENAFIL 50 MG/1
25-50 TABLET, FILM COATED ORAL DAILY PRN
Qty: 6 TABLET | Refills: 1 | Status: SHIPPED | OUTPATIENT
Start: 2017-08-17 | End: 2018-11-07

## 2017-08-17 RX ORDER — TRIAMCINOLONE ACETONIDE 1 MG/G
CREAM TOPICAL
Qty: 30 G | Refills: 0 | Status: SHIPPED | OUTPATIENT
Start: 2017-08-17 | End: 2018-11-07

## 2017-08-17 RX ORDER — DOXYCYCLINE 100 MG/1
100 CAPSULE ORAL 2 TIMES DAILY
Qty: 20 CAPSULE | Refills: 0 | Status: SHIPPED | OUTPATIENT
Start: 2017-08-17 | End: 2017-08-27

## 2017-08-17 RX ORDER — PREDNISONE 20 MG/1
40 TABLET ORAL DAILY
Qty: 10 TABLET | Refills: 0 | Status: SHIPPED | OUTPATIENT
Start: 2017-08-17 | End: 2017-08-22

## 2017-08-17 ASSESSMENT — PATIENT HEALTH QUESTIONNAIRE - PHQ9
SUM OF ALL RESPONSES TO PHQ QUESTIONS 1-9: 0
5. POOR APPETITE OR OVEREATING: NOT AT ALL
5. POOR APPETITE OR OVEREATING: SEVERAL DAYS

## 2017-08-17 ASSESSMENT — ANXIETY QUESTIONNAIRES
3. WORRYING TOO MUCH ABOUT DIFFERENT THINGS: MORE THAN HALF THE DAYS
GAD7 TOTAL SCORE: 8
5. BEING SO RESTLESS THAT IT IS HARD TO SIT STILL: NOT AT ALL
GAD7 TOTAL SCORE: 0
7. FEELING AFRAID AS IF SOMETHING AWFUL MIGHT HAPPEN: NOT AT ALL
IF YOU CHECKED OFF ANY PROBLEMS ON THIS QUESTIONNAIRE, HOW DIFFICULT HAVE THESE PROBLEMS MADE IT FOR YOU TO DO YOUR WORK, TAKE CARE OF THINGS AT HOME, OR GET ALONG WITH OTHER PEOPLE: SOMEWHAT DIFFICULT
2. NOT BEING ABLE TO STOP OR CONTROL WORRYING: MORE THAN HALF THE DAYS
6. BECOMING EASILY ANNOYED OR IRRITABLE: NOT AT ALL
1. FEELING NERVOUS, ANXIOUS, OR ON EDGE: MORE THAN HALF THE DAYS
5. BEING SO RESTLESS THAT IT IS HARD TO SIT STILL: NOT AT ALL
2. NOT BEING ABLE TO STOP OR CONTROL WORRYING: NOT AT ALL
7. FEELING AFRAID AS IF SOMETHING AWFUL MIGHT HAPPEN: SEVERAL DAYS
1. FEELING NERVOUS, ANXIOUS, OR ON EDGE: NOT AT ALL
6. BECOMING EASILY ANNOYED OR IRRITABLE: NOT AT ALL
3. WORRYING TOO MUCH ABOUT DIFFERENT THINGS: NOT AT ALL

## 2017-08-17 NOTE — PATIENT INSTRUCTIONS
Based on your medical history and these are the current health maintenance or preventive care services that you are due for (some may have been done at this visit)  There are no preventive care reminders to display for this patient.      At [facility], we strive to deliver an exceptional experience to you, every time we see you.    If you receive a survey in the mail, please send us back your thoughts. We really do value your feedback.    Your care team's suggested websites for health information:  Www.[place].org : Up to date and easily searchable information on multiple topics.  Www.medlineplus.gov : medication info, interactive tutorials, watch real surgeries online  Www.familydoctor.org : good info from the Academy of Family Physicians  Www.cdc.gov : public health info, travel advisories, epidemics (H1N1)  Www.aap.org : children's health info, normal development, vaccinations  Www.health.[facility].[place].us : [place] dept of health, public health issues in [place], N1N1    How to contact your care team:   Team [facility] [phone]         Pharmacy [phone]    Dr. Rossi, Brenda Casillas PA-C, Dr. Healy, Tessa CRISTINA CNP, Batsheva De La Torre PA-C, Dr. Patton, and IBETH Gonsalez CNP    Team RNs: Norma Albert      Clinic hours  M-Th 7 am-7 pm   Fri 7 am-5 pm.   Urgent care M-F 11 am-9 pm,   Sat/Sun 9 am-5 pm.  Pharmacy M-Th 8 am-8 pm Fri 8 am-6 pm  Sat/Sun 9 am-5 pm.     All password changes, disabled accounts, or ID changes in [employer]/MyHealth will be done by our Access Services Department.    If you need help with your account or password, call: [phone]. Clinic staff no longer has the ability to change passwords.

## 2017-08-17 NOTE — MR AVS SNAPSHOT
After Visit Summary   8/17/2017    Juwan Butt    MRN: [id number]           Patient Information     Date Of Birth          1950        Visit Information        Provider Department      8/17/2017 3:00 PM Liliana Howard MD [facility]        Today's Diagnoses     Allergic reaction, initial encounter    -  1    Other male erectile dysfunction        Elevated blood pressure reading without diagnosis of hypertension        Prediabetes        Elevated prostate specific antigen (PSA)        Urinary hesitancy          Care Instructions    Based on your medical history and these are the current health maintenance or preventive care services that you are due for (some may have been done at this visit)  There are no preventive care reminders to display for this patient.      At [facility], we strive to deliver an exceptional experience to you, every time we see you.    If you receive a survey in the mail, please send us back your thoughts. We really do value your feedback.    Your care team's suggested websites for health information:  Www.[facility].org : Up to date and easily searchable information on multiple topics.  Www.medlineplus.gov : medication info, interactive tutorials, watch real surgeries online  Www.familydoctor.org : good info from the Academy of Family Physicians  Www.cdc.gov : public health info, travel advisories, epidemics (H1N1)  Www.aap.org : children's health info, normal development, vaccinations  Www.health.[facility].[place].us : [place] dept of health, public health issues in [place], N1N1    How to contact your care team:   Team Luci/Pato [phone]         Pharmacy [phone]    Dr. Rossi, Brenda Casillas PA-C, Dr. Healy, Tessa Manzo APRN CNP, Batsheva De La Torre PA-C, Dr. Patton, and Gail Rausch, APRN CNP    Team RNs: Norma & Bety      Clinic hours  M-Th 7 am-7 pm   Fri 7 am-5 pm.   Urgent care M-F 11 am-9 pm,   Sat/Sun 9  "am-5 pm.  Pharmacy M-Th 8 am-8 pm Fri 8 am-6 pm  Sat/Sun 9 am-5 pm.     All password changes, disabled accounts, or ID changes in Intellisense/MyHealth will be done by our Access Services Department.    If you need help with your account or password, call: [phone]. Clinic staff no longer has the ability to change passwords.               Follow-ups after your visit        Who to contact     If you have questions or need follow up information about today's clinic visit or your schedule please contact [facility] directly at [phone].  Normal or non-critical lab and imaging results will be communicated to you by MyChart, letter or phone within 4 business days after the clinic has received the results. If you do not hear from us within 7 days, please contact the clinic through Parabelhart or phone. If you have a critical or abnormal lab result, we will notify you by phone as soon as possible.  Submit refill requests through Intellis[employer] or call your pharmacy and they will forward the refill request to us. Please allow 3 business days for your refill to be completed.          Additional Information About Your Visit        Parabelhart Information     Intellisense lets you send messages to your doctor, view your test results, renew your prescriptions, schedule appointments and more. To sign up, go to [email or web address] . Click on \"Log in\" on the left side of the screen, which will take you to the Welcome page. Then click on \"Sign up Now\" on the right side of the page.     You will be asked to enter the access code listed below, as well as some personal information. Please follow the directions to create your username and password.     Your access code is: [email or web address]  Expires: 2017  3:03 PM     Your access code will  in 90 days. If you need help or a new code, please call your [facility] or [phone].        Care EveryWhere ID     This is your Care EveryWhere ID. This could be used by other " organizations to access your [place] medical records  [id number]        Your Vitals Were     Pulse Temperature Pulse Oximetry BMI (Body Mass Index)          54 96.7  F (35.9  C) (Oral) 95% 30.87 kg/m2         Blood Pressure from Last 3 Encounters:   08/17/17 138/86   07/25/17 161/89   02/23/17 190/90    Weight from Last 3 Encounters:   08/17/17 234 lb (106.1 kg)   07/25/17 239 lb 14.4 oz (108.8 kg)   02/23/17 225 lb (102.1 kg)              We Performed the Following     Hemoglobin A1c     PSA, total and free          Today's Medication Changes          These changes are accurate as of: 8/17/17  3:31 PM.  If you have any questions, ask your nurse or doctor.               Start taking these medicines.        Dose/Directions    doxycycline 100 MG capsule   Commonly known as:  VIBRAMYCIN   Used for:  Allergic reaction, initial encounter   Started by:  Liliana Howard MD        Dose:  100 mg   Take 1 capsule (100 mg) by mouth 2 times daily for 10 days   Quantity:  20 capsule   Refills:  0       predniSONE 20 MG tablet   Commonly known as:  DELTASONE   Used for:  Allergic reaction, initial encounter   Started by:  Liliana Howard MD        Dose:  40 mg   Take 2 tablets (40 mg) by mouth daily for 5 days   Quantity:  10 tablet   Refills:  0       sildenafil 50 MG cap/tab   Commonly known as:  REVATIO/VIAGRA   Used for:  Other male erectile dysfunction   Started by:  Liliana Howard MD        Dose:  25-50 mg   Take 0.5-1 tablets (25-50 mg) by mouth daily as needed for erectile dysfunction Take 30 min to 4 hours before intercourse.  Never use with nitroglycerin, terazosin or doxazosin.   Quantity:  6 tablet   Refills:  1       triamcinolone 0.1 % cream   Commonly known as:  KENALOG   Used for:  Allergic reaction, initial encounter   Started by:  Liliana Howard MD        Apply sparingly to affected area three times daily for 14 days.   Quantity:  30 g    Refills:  0            Where to get your medicines      These medications [facility] [address] [place] [place] [address]  [address] [place] [address]     Phone:  [phone]     doxycycline 100 MG capsule    predniSONE 20 MG tablet    triamcinolone 0.1 % cream         Some of these will need a paper prescription and others can be bought over the counter.  Ask your nurse if you have questions.     Bring a paper prescription for each of these medications     sildenafil 50 MG cap/tab                Primary Care Provider Office Phone # Fax #    Liliana Ingris Sam [phone] [address]  [facility] [address]        Equal Access to Services     DARIUSZ PEREZ : Hadii mariela remy hadasho Soomaali, waaxda luqadaha, qaybta kaalmada adeegyada, waxgerry minin erna parrish . So [facility] [phone].    ATENCIÓN: Si habla español, tiene a barnes disposición servicios gratuitos de asistencia lingüística. [facility] [phone].    We comply with applicable federal civil rights laws and [place] laws. We do not discriminate on the basis of race, color, national origin, age, disability sex, sexual orientation or gender identity.            Thank you!     Thank you for choosing [facility]  for your care. Our goal is always to provide you with excellent care. Hearing back from our patients is one way we can continue to improve our services. Please take a few minutes to complete the written survey that you may receive in the mail after your visit with us. Thank you!             Your Updated Medication List - Protect others around you: Learn how to safely use, store and throw away your medicines at www.disposemymeds.org.          This list is accurate as of: 8/17/17  3:31 PM.  Always use your most recent med list.                   Brand Name Dispense Instructions for use Diagnosis    doxycycline 100 MG capsule     VIBRAMYCIN    20 capsule    Take 1 capsule (100 mg) by mouth 2 times daily for 10 days    Allergic reaction, initial encounter       predniSONE 20 MG tablet    DELTASONE    10 tablet    Take 2 tablets (40 mg) by mouth daily for 5 days    Allergic reaction, initial encounter       sildenafil 50 MG cap/tab    REVATIO/VIAGRA    6 tablet    Take 0.5-1 tablets (25-50 mg) by mouth daily as needed for erectile dysfunction Take 30 min to 4 hours before intercourse.  Never use with nitroglycerin, terazosin or doxazosin.    Other male erectile dysfunction       triamcinolone 0.1 % cream    KENALOG    30 g    Apply sparingly to affected area three times daily for 14 days.    Allergic reaction, initial encounter

## 2017-08-17 NOTE — PROGRESS NOTES
SUBJECTIVE:   Juwan Butt is a 66 year old male who presents to clinic today for the following health issues:      Concern - Bug Bite  Onset: 2 weeks ago    Description:   Possible Spider, getting in other spots of the body    Intensity: moderate    Progression of Symptoms:  worsening and constant    Accompanying Signs & Symptoms:  Round, itching    Previous history of similar problem:   na    Precipitating factors:   Worsened by: na    Alleviating factors:  Improved by: na    Therapies Tried and outcome: na    Elevated BP - has been working on this.  Was late and very worried about not being seen.    Erectile dysfunction - viagra working well and requesting a paper prescription refill.    Prediabetes - has not been doing so well on decreasing his sweet intake recently.    Elevated PSA - no new/worsening urinary symtpoms      Problem list and histories reviewed & adjusted, as indicated.  Additional history: as documented    Patient Active Problem List   Diagnosis     Elevated blood pressure reading without diagnosis of hypertension     Unilateral inguinal hernia without obstruction or gangrene, recurrence not specified     CARDIOVASCULAR SCREENING; LDL GOAL LESS THAN 160     Advance Care Planning     First degree AV block     Prediabetes     C. difficile diarrhea     Rectal bleeding     Elevated prostate specific antigen (PSA)     Other male erectile dysfunction     Past Surgical History:   Procedure Laterality Date     COLONOSCOPY  2006     COLONOSCOPY WITH CO2 INSUFFLATION N/A 8/29/2016    Procedure: COLONOSCOPY WITH CO2 INSUFFLATION;  Surgeon: Tom Odom MD;  Location:  OR     HERNIORRHAPHY INGUINAL Right 1/13/2016    Procedure: HERNIORRHAPHY INGUINAL;  Surgeon: Tom Odom MD;  Location:  OR     TRANSANAL MINIMALLY INVASIVE SURGERY N/A 12/7/2016    Procedure: TRANSANAL MINIMALLY INVASIVE SURGERY;  Surgeon: Jaron Alan MD;  Location:  OR       Social History   Substance Use  Topics     Smoking status: Former Smoker     Packs/day: 1.00     Years: 20.00     Types: Cigarettes     Smokeless tobacco: Never Used      Comment: quit smoking 30 yr ago     Alcohol use 0.0 oz/week     0 Cans of beer per week      Comment: occasional when going out for dinner     Family History   Problem Relation Age of Onset     Other Cancer Father 76     esophagus     Hypertension Mother      OSTEOPOROSIS Mother              Reviewed and updated as needed this visit by clinical staff     Reviewed and updated as needed this visit by Provider         ROS:  Constitutional, HEENT, cardiovascular, pulmonary, GI, , musculoskeletal, neuro, skin, endocrine and psych systems are negative, except as otherwise noted.      OBJECTIVE:   /86  Pulse 54  Temp 96.7  F (35.9  C) (Oral)  Wt 234 lb (106.1 kg)  SpO2 95%  BMI 30.87 kg/m2  Body mass index is 30.87 kg/(m^2).  GENERAL: healthy, alert and no distress  NECK: no adenopathy, no asymmetry, masses, or scars and thyroid normal to palpation  RESP: lungs clear to auscultation - no rales, rhonchi or wheezes  CV: regular rate and rhythm, normal S1 S2, no S3 or S4, no murmur, click or rub, no peripheral edema and peripheral pulses strong  ABDOMEN: soft, nontender, no hepatosplenomegaly, no masses and bowel sounds normal  MS: no gross musculoskeletal defects noted, no edema  SKIN: red vesicular lesion and right abd, bilateral legs and arms, red scaled patch on right lower abd  PSYCH: mentation appears normal, affect normal/bright    Diagnostic Test Results:  none     ASSESSMENT/PLAN:     1. Allergic reaction, initial encounter  Appear to be infected allergic reaction - treatment as below  - doxycycline (VIBRAMYCIN) 100 MG capsule; Take 1 capsule (100 mg) by mouth 2 times daily for 10 days  Dispense: 20 capsule; Refill: 0  - predniSONE (DELTASONE) 20 MG tablet; Take 2 tablets (40 mg) by mouth daily for 5 days  Dispense: 10 tablet; Refill: 0  - triamcinolone (KENALOG) 0.1  % cream; Apply sparingly to affected area three times daily for 14 days.  Dispense: 30 g; Refill: 0    2. Other male erectile dysfunction  Refilled  - sildenafil (REVATIO/VIAGRA) 50 MG cap/tab; Take 0.5-1 tablets (25-50 mg) by mouth daily as needed for erectile dysfunction Take 30 min to 4 hours before intercourse.  Never use with nitroglycerin, terazosin or doxazosin.  Dispense: 6 tablet; Refill: 1    3. Elevated blood pressure reading without diagnosis of hypertension  Low sodium diet and recommended home monitoring    4. Prediabetes  Recheck today  - Hemoglobin A1c    5. Elevated prostate specific antigen (PSA)  Recheck today  - PSA, total and free    6. Urinary hesitancy  Recheck lab today.  - PSA, total and free    The uses and side effects, including black box warnings as appropriate, were discussed in detail.  All patient questions were answered.  The patient was instructed to call immediately if any side effects developed.     FUTURE APPOINTMENTS:       - Follow-up visit in 3 months for BP recheck since he had such a large BP drop today.    Liliana Sam MD  [facility]

## 2017-08-18 ASSESSMENT — ANXIETY QUESTIONNAIRES: GAD7 TOTAL SCORE: 0

## 2017-08-19 LAB
PSA FREE MFR SERPL: 20 %
PSA FREE SERPL-MCNC: 0.4 NG/ML
PSA SERPL-MCNC: 2 NG/ML (ref 0–4)

## 2017-08-22 ENCOUNTER — TELEPHONE (OUTPATIENT)
Dept: FAMILY MEDICINE | Facility: CLINIC | Age: 67
End: 2017-08-22

## 2017-08-22 NOTE — PROGRESS NOTES
Please call patient.    Labs show he does have diabetes now.  Please call to schedule an appointment for discussion.  Your prostate test is normal.    Liliana Rossi M.D.

## 2017-08-22 NOTE — TELEPHONE ENCOUNTER
This writer attempted to contact Juwan on 08/22/17      Reason for call results and unable to leave message. Patient needs an Office visit to discuss      When patient calls back, please contact clinic RN team. If no one available, document that pt called and route to care team. routine priority.      Notes Recorded by Liliana Howard MD on 8/22/2017 at 10:45 AM  Please call patient.    Labs show he does have diabetes now.  Please call to schedule an appointment for discussion.  Your prostate test is normal.    Liliana Rossi M.D.    Diya Hill, NICOLASA

## 2017-10-04 NOTE — TELEPHONE ENCOUNTER
Patient was informed of test results and recommendations from provider. Patient verbalized understanding of all information given. Patient states he doesn't want to come back in to discuss. Last office visit was $400 and some dollars and feels too expensive. States will cut down on sugar. Does not wasn't to return.  Routing to provider to review and advise.  Diya Hill RN.

## 2018-09-29 ENCOUNTER — TRANSFERRED RECORDS (OUTPATIENT)
Dept: HEALTH INFORMATION MANAGEMENT | Facility: CLINIC | Age: 68
End: 2018-09-29

## 2018-10-25 DIAGNOSIS — N52.8 OTHER MALE ERECTILE DYSFUNCTION: ICD-10-CM

## 2018-10-25 NOTE — TELEPHONE ENCOUNTER
"Requested Prescriptions   Pending Prescriptions Disp Refills     sildenafil (VIAGRA) 50 MG tablet  Last Written Prescription Date:  08/17/17  Last Fill Quantity: 6,  # refills: 1   Last Office Visit with G, P or [facility] prescribing provider:  08/17/17-Apolinar Sam   Future Office Visit:    6 tablet 1     Sig: Take 0.5-1 tablets (25-50 mg) by mouth daily as needed Take 30 min to 4 hours before intercourse.  Never use with nitroglycerin, terazosin or doxazosin.    Erectile Dysfuction Protocol Failed    10/25/2018  7:34 AM       Failed - Recent (12 mo) or future (30 days) visit within the authorizing provider's specialty    Patient had office visit in the last 12 months or has a visit in the next 30 days with authorizing provider or within the authorizing provider's specialty.  See \"Patient Info\" tab in inbasket, or \"Choose Columns\" in Meds & Orders section of the refill encounter.             Passed - Absence of nitrates on medication list       Passed - Absence of Alpha Blockers on Med list       Passed - Patient is age 18 or older          "

## 2018-10-26 RX ORDER — SILDENAFIL 50 MG/1
25-50 TABLET, FILM COATED ORAL DAILY PRN
Qty: 6 TABLET | Refills: 1 | OUTPATIENT
Start: 2018-10-26

## 2018-10-26 NOTE — TELEPHONE ENCOUNTER
Called and spoke to the patient and scheduled a Physical with Dr reyna Sam on 11/7/18 at 8:20 am.  Jacquelyn Lucero MA/  For Teams Spirit and Luci

## 2018-10-26 NOTE — TELEPHONE ENCOUNTER
Routing refill request to provider for review/approval because:  Patient needs to be seen because it has been more than 1 year since last office visit.  Would appreciate provider review.  Kim Robertson RN

## 2018-11-07 ENCOUNTER — OFFICE VISIT (OUTPATIENT)
Dept: FAMILY MEDICINE | Facility: CLINIC | Age: 68
End: 2018-11-07
Payer: COMMERCIAL

## 2018-11-07 VITALS
DIASTOLIC BLOOD PRESSURE: 86 MMHG | HEART RATE: 47 BPM | BODY MASS INDEX: 31.01 KG/M2 | HEIGHT: 73 IN | SYSTOLIC BLOOD PRESSURE: 144 MMHG | WEIGHT: 234 LBS | TEMPERATURE: 97.9 F | OXYGEN SATURATION: 93 %

## 2018-11-07 DIAGNOSIS — Z23 NEED FOR PROPHYLACTIC VACCINATION AGAINST STREPTOCOCCUS PNEUMONIAE (PNEUMOCOCCUS): ICD-10-CM

## 2018-11-07 DIAGNOSIS — N52.8 OTHER MALE ERECTILE DYSFUNCTION: ICD-10-CM

## 2018-11-07 DIAGNOSIS — Z11.4 SCREENING FOR HIV (HUMAN IMMUNODEFICIENCY VIRUS): ICD-10-CM

## 2018-11-07 DIAGNOSIS — E11.9 TYPE 2 DIABETES MELLITUS WITHOUT COMPLICATION, WITHOUT LONG-TERM CURRENT USE OF INSULIN (H): ICD-10-CM

## 2018-11-07 DIAGNOSIS — Z00.00 ENCOUNTER FOR ROUTINE ADULT HEALTH EXAMINATION WITHOUT ABNORMAL FINDINGS: Primary | ICD-10-CM

## 2018-11-07 DIAGNOSIS — Z23 NEED FOR PROPHYLACTIC VACCINATION AND INOCULATION AGAINST INFLUENZA: ICD-10-CM

## 2018-11-07 LAB
ALBUMIN SERPL-MCNC: 4 G/DL (ref 3.4–5)
ALP SERPL-CCNC: 93 U/L (ref 40–150)
ALT SERPL W P-5'-P-CCNC: 34 U/L (ref 0–70)
ANION GAP SERPL CALCULATED.3IONS-SCNC: 7 MMOL/L (ref 3–14)
AST SERPL W P-5'-P-CCNC: 24 U/L (ref 0–45)
BILIRUB SERPL-MCNC: 1 MG/DL (ref 0.2–1.3)
BUN SERPL-MCNC: 10 MG/DL (ref 7–30)
CALCIUM SERPL-MCNC: 8.7 MG/DL (ref 8.5–10.1)
CHLORIDE SERPL-SCNC: 104 MMOL/L (ref 94–109)
CHOLEST SERPL-MCNC: 164 MG/DL
CO2 SERPL-SCNC: 27 MMOL/L (ref 20–32)
CREAT SERPL-MCNC: 0.85 MG/DL (ref 0.66–1.25)
CREAT UR-MCNC: 61 MG/DL
GFR SERPL CREATININE-BSD FRML MDRD: >90 ML/MIN/1.7M2
GLUCOSE SERPL-MCNC: 139 MG/DL (ref 70–99)
HBA1C MFR BLD: 7.8 % (ref 0–5.6)
HDLC SERPL-MCNC: 40 MG/DL
LDLC SERPL CALC-MCNC: 79 MG/DL
MICROALBUMIN UR-MCNC: 16 MG/L
MICROALBUMIN/CREAT UR: 25.41 MG/G CR (ref 0–17)
NONHDLC SERPL-MCNC: 124 MG/DL
POTASSIUM SERPL-SCNC: 4.1 MMOL/L (ref 3.4–5.3)
PROT SERPL-MCNC: 8.2 G/DL (ref 6.8–8.8)
SODIUM SERPL-SCNC: 138 MMOL/L (ref 133–144)
TRIGL SERPL-MCNC: 224 MG/DL
TSH SERPL DL<=0.005 MIU/L-ACNC: 1.94 MU/L (ref 0.4–4)

## 2018-11-07 PROCEDURE — 84443 ASSAY THYROID STIM HORMONE: CPT | Performed by: FAMILY MEDICINE

## 2018-11-07 PROCEDURE — 80061 LIPID PANEL: CPT | Performed by: FAMILY MEDICINE

## 2018-11-07 PROCEDURE — 99397 PER PM REEVAL EST PAT 65+ YR: CPT | Performed by: FAMILY MEDICINE

## 2018-11-07 PROCEDURE — 83036 HEMOGLOBIN GLYCOSYLATED A1C: CPT | Performed by: FAMILY MEDICINE

## 2018-11-07 PROCEDURE — 80053 COMPREHEN METABOLIC PANEL: CPT | Performed by: FAMILY MEDICINE

## 2018-11-07 PROCEDURE — 82043 UR ALBUMIN QUANTITATIVE: CPT | Performed by: FAMILY MEDICINE

## 2018-11-07 PROCEDURE — 36415 COLL VENOUS BLD VENIPUNCTURE: CPT | Performed by: FAMILY MEDICINE

## 2018-11-07 RX ORDER — SILDENAFIL 50 MG/1
25-50 TABLET, FILM COATED ORAL DAILY PRN
Qty: 6 TABLET | Refills: 11 | Status: SHIPPED | OUTPATIENT
Start: 2018-11-07 | End: 2021-10-06

## 2018-11-07 ASSESSMENT — PAIN SCALES - GENERAL: PAINLEVEL: NO PAIN (0)

## 2018-11-07 NOTE — PATIENT INSTRUCTIONS
At [facility], we strive to deliver an exceptional experience to you, every time we see you.  If you receive a survey in the mail, please send us back your thoughts. We really do value your feedback.    Your care team:                            Family Medicine Internal Medicine   MD Robin Dubois MD Shantel Branch-Fleming, MD Katya Georgiev PA-C Megan Hill, APRN CNP    Jayme Fierro MD Pediatrics   Haider Quiles, JOSIE Person, MD Tessa Izaguirre APRN CNP   MD Irene Espinosa MD Deborah Mielke, MD Gail Rausch, APRN [facility]      Clinic hours: Monday - Thursday 7 am-7 pm; Fridays 7 am-5 pm.   Urgent care: Monday - Friday 11 am-9 pm; Saturday and Sunday 9 am-5 pm.  Pharmacy : Monday -Thursday 8 am-8 pm; Friday 8 am-6 pm; Saturday and Sunday 9 am-5 pm.     Clinic: [phone]   Pharmacy: [phone]          Preventive Health Recommendations:     See your health care provider every year to    Review health changes.     Discuss preventive care.      Review your medicines if your doctor has prescribed any.    Talk with your health care provider about whether you should have a test to screen for prostate cancer (PSA).    Every 3 years, have a diabetes test (fasting glucose). If you are at risk for diabetes, you should have this test more often.    Every 5 years, have a cholesterol test. Have this test more often if you are at risk for high cholesterol or heart disease.     Every 10 years, have a colonoscopy. Or, have a yearly FIT test (stool test). These exams will check for colon cancer.    Talk to with your health care provider about screening for Abdominal Aortic Aneurysm if you have a family history of AAA or have a history of smoking.  Shots:     Get a flu shot each year.     Get a tetanus shot every 10 years.     Talk to your doctor about your pneumonia vaccines. There are now two you should receive - Pneumovax (PPSV 23) and Prevnar  (PCV 13).    Talk to your pharmacist about a shingles vaccine.     Talk to your doctor about the hepatitis B vaccine.  Nutrition:     Eat at least 5 servings of fruits and vegetables each day.     Eat whole-grain bread, whole-wheat pasta and brown rice instead of white grains and rice.     Get adequate Calcium and Vitamin D.   Lifestyle    Exercise for at least 150 minutes a week (30 minutes a day, 5 days a week). This will help you control your weight and prevent disease.     Limit alcohol to one drink per day.     No smoking.     Wear sunscreen to prevent skin cancer.     See your dentist every six months for an exam and cleaning.     See your eye doctor every 1 to 2 years to screen for conditions such as glaucoma, macular degeneration and cataracts.    Personalized Prevention Plan  You are due for the preventive services outlined below.  Your care team is available to assist you in scheduling these services.  If you have already completed any of these items, please share that information with your care team to update in your medical record.    Health Maintenance Due   Topic Date Due     HIV SCREEN (SYSTEM ASSIGNED)  12/12/1970     FALL RISK ASSESSMENT  12/12/2017     Pneumococcal Vaccine (1 of 2 - PCV13) 12/12/2017     AORTIC ANEURYSM SCREENING (SYSTEM ASSIGNED)  12/12/2017     Depression Assessment 2 - yearly  08/17/2018     Flu Vaccine (1) 09/01/2018

## 2018-11-07 NOTE — MR AVS SNAPSHOT
After Visit Summary   11/7/2018    Juwan Butt    MRN: [id number]           Patient Information     Date Of Birth          1950        Visit Information        Provider Department      11/7/2018 8:20 AM Liliana Howard MD [facility]        Today's Diagnoses     Encounter for routine adult health examination without abnormal findings    -  1    Type 2 diabetes mellitus without complication, without long-term current use of insulin (H)        Other male erectile dysfunction        Screening for HIV (human immunodeficiency virus)        Need for prophylactic vaccination and inoculation against influenza        Need for prophylactic vaccination against Streptococcus pneumoniae (pneumococcus)          Care Instructions    At [facility], we strive to deliver an exceptional experience to you, every time we see you.  If you receive a survey in the mail, please send us back your thoughts. We really do value your feedback.    Your care team:                            Family Medicine Internal Medicine   MD Robin Dubois MD Shantel Branch-Fleming, MD Katya Georgiev PA-C Megan Hill, APRN YULIYA Fierro MD Pediatrics   JOSIE Mei, MD Tessa Izaguirre APRN CNP   MD Irene Espinosa MD Deborah Mielke, MD Kim Thein, APRN [facility]      Clinic hours: Monday - Thursday 7 am-7 pm; Fridays 7 am-5 pm.   Urgent care: Monday - Friday 11 am-9 pm; Saturday and Sunday 9 am-5 pm.  Pharmacy : Monday -Thursday 8 am-8 pm; Friday 8 am-6 pm; Saturday and Sunday 9 am-5 pm.     Clinic: [phone]   Pharmacy: [phone]          Preventive Health Recommendations:     See your health care provider every year to    Review health changes.     Discuss preventive care.      Review your medicines if your doctor has prescribed any.    Talk with your health care provider about whether you should  have a test to screen for prostate cancer (PSA).    Every 3 years, have a diabetes test (fasting glucose). If you are at risk for diabetes, you should have this test more often.    Every 5 years, have a cholesterol test. Have this test more often if you are at risk for high cholesterol or heart disease.     Every 10 years, have a colonoscopy. Or, have a yearly FIT test (stool test). These exams will check for colon cancer.    Talk to with your health care provider about screening for Abdominal Aortic Aneurysm if you have a family history of AAA or have a history of smoking.  Shots:     Get a flu shot each year.     Get a tetanus shot every 10 years.     Talk to your doctor about your pneumonia vaccines. There are now two you should receive - Pneumovax (PPSV 23) and Prevnar (PCV 13).    Talk to your pharmacist about a shingles vaccine.     Talk to your doctor about the hepatitis B vaccine.  Nutrition:     Eat at least 5 servings of fruits and vegetables each day.     Eat whole-grain bread, whole-wheat pasta and brown rice instead of white grains and rice.     Get adequate Calcium and Vitamin D.   Lifestyle    Exercise for at least 150 minutes a week (30 minutes a day, 5 days a week). This will help you control your weight and prevent disease.     Limit alcohol to one drink per day.     No smoking.     Wear sunscreen to prevent skin cancer.     See your dentist every six months for an exam and cleaning.     See your eye doctor every 1 to 2 years to screen for conditions such as glaucoma, macular degeneration and cataracts.    Personalized Prevention Plan  You are due for the preventive services outlined below.  Your care team is available to assist you in scheduling these services.  If you have already completed any of these items, please share that information with your care team to update in your medical record.    Health Maintenance Due   Topic Date Due     HIV SCREEN (SYSTEM ASSIGNED)  12/12/1970     FALL RISK  "ASSESSMENT  2017     Pneumococcal Vaccine (1 of 2 - PCV13) 2017     AORTIC ANEURYSM SCREENING (SYSTEM ASSIGNED)  2017     Depression Assessment 2 - yearly  2018     Flu Vaccine (1) 2018             Follow-ups after your visit        Who to contact     If you have questions or need follow up information about today's clinic visit or your schedule please contact [facility] directly at [phone].  Normal or non-critical lab and imaging results will be communicated to you by ProTendershart, letter or phone within 4 business days after the clinic has received the results. If you do not hear from us within 7 days, please contact the clinic through ProTendershart or phone. If you have a critical or abnormal lab result, we will notify you by phone as soon as possible.  Submit refill requests through AIS or call your pharmacy and they will forward the refill request to us. Please allow 3 business days for your refill to be completed.          Additional Information About Your Visit        AIS Information     AIS lets you send messages to your doctor, view your test results, renew your prescriptions, schedule appointments and more. To sign up, go to [email or web address] . Click on \"Log in\" on the left side of the screen, which will take you to the Welcome page. Then click on \"Sign up Now\" on the right side of the page.     You will be asked to enter the access code listed below, as well as some personal information. Please follow the directions to create your username and password.     Your access code is: [email or web address]  Expires: 2019  8:48 AM     Your access code will  in 90 days. If you need help or a new code, please call your [place] clinic or [phone].        Care EveryWhere ID     This is your Care EveryWhere ID. This could be used by other organizations to access your [place] medical records  [id number]        Your Vitals Were     Pulse Temperature " "Height Pulse Oximetry BMI (Body Mass Index)       47 97.9  F (36.6  C) (Oral) 6' 0.5\" (1.842 m) 93% 31.3 kg/m2        Blood Pressure from Last 3 Encounters:   11/07/18 (!) 150/100   08/17/17 138/86   07/25/17 161/89    Weight from Last 3 Encounters:   11/07/18 234 lb (106.1 kg)   08/17/17 234 lb (106.1 kg)   07/25/17 239 lb 14.4 oz (108.8 kg)              We Performed the Following     Albumin Random Urine Quantitative with Creat Ratio     Comprehensive metabolic panel     Hemoglobin A1c     Lipid panel reflex to direct LDL Fasting     TSH with free T4 reflex          Today's Medication Changes          These changes are accurate as of 11/7/18  8:48 AM.  If you have any questions, ask your nurse or doctor.               These medicines have changed or have updated prescriptions.        Dose/Directions    sildenafil 50 MG tablet   Commonly known as:  VIAGRA   This may have changed:    - reasons to take this  - additional instructions   Used for:  Other male erectile dysfunction   Changed by:  Liliana Howard MD        Dose:  25-50 mg   Take 0.5-1 tablets (25-50 mg) by mouth daily as needed (Take 30 min to 4 hours before intercourse.  Never use with nitroglycerin, terazosin or doxazosin.)   Quantity:  6 tablet   Refills:  11         Stop taking these medicines if you haven't already. Please contact your care team if you have questions.     triamcinolone 0.1 % cream   Commonly known as:  KENALOG   Stopped by:  Liliana Howard MD                Where to get your medicines      Some of these will need a paper prescription and others can be bought over the counter.  Ask your nurse if you have questions.     Bring a paper prescription for each of these medications     sildenafil 50 MG tablet                Primary Care Provider Office Phone # Fax #    Liliana Sam [phone]       [id number]  [facility] [address]        Equal Access to " Services     [facility]: Hadii mariela Cadena, waaxda luqadaha, qaybta kaalmamay ulloa, luis manuel uribe. So [facility] [phone].    ATENCIÓN: Si habla español, tiene a barnes disposición servicios gratuitos de asistencia lingüística. Llame al [phone].    We comply with applicable federal civil rights laws and [place] laws. We do not discriminate on the basis of race, color, national origin, age, disability, sex, sexual orientation, or gender identity.            Thank you!     Thank you for choosing [facility]  for your care. Our goal is always to provide you with excellent care. Hearing back from our patients is one way we can continue to improve our services. Please take a few minutes to complete the written survey that you may receive in the mail after your visit with us. Thank you!             Your Updated Medication List - Protect others around you: Learn how to safely use, store and throw away your medicines at www.disposemymeds.org.          This list is accurate as of 11/7/18  8:48 AM.  Always use your most recent med list.                   Brand Name Dispense Instructions for use Diagnosis    sildenafil 50 MG tablet    VIAGRA    6 tablet    Take 0.5-1 tablets (25-50 mg) by mouth daily as needed (Take 30 min to 4 hours before intercourse.  Never use with nitroglycerin, terazosin or doxazosin.)    Other male erectile dysfunction

## 2018-11-07 NOTE — PROGRESS NOTES
"  SUBJECTIVE:   Juwan Butt is a 67 year old male who presents for Preventive Visit.    Are you in the first 12 months of your Medicare Part B coverage?  No    Physical Health:    In general, how would you rate your overall physical health? good    Outside of work, how many days during the week do you exercise? 4-5 days/week    Outside of work, approximately how many minutes a day do you exercise?15-30 minutes    If you drink alcohol do you typically have >3 drinks per day or >7 drinks per week? No    Do you usually eat at least 4 servings of fruit and vegetables a day, include whole grains & fiber and avoid regularly eating high fat or \"junk\" foods? Yes    Do you have any problems taking medications regularly?  No    Do you have any side effects from medications? none    Needs assistance for the following daily activities: no assistance needed    Which of the following safety concerns are present in your home?:  none identified     Hearing impairment: No    In the past 6 months, have you been bothered by leaking of urine? no    Mental Health:    In general, how would you rate your overall mental or emotional health? excellent  PHQ-2 Score:      Additional concerns to address?  No    Fall risk:      Fallen 2 or more times in the past year?: No  Any fall with injury in the past year?: No    COGNITIVE SCREEN  1) Repeat 3 items (Leader, Season, Table)    2) Clock draw: NORMAL  3) 3 item recall: Recalls 3 objects  Results: 3 items recalled: COGNITIVE IMPAIRMENT LESS LIKELY    Mini-CogTM Copyright S Jessica. Licensed by the author for use in [facility]; reprinted with permission (vania@.[facility]). All rights reserved.            DM2 - diet controlled.  No hypoglycemia, polydipsia or polyuria.  Had some blood testing done at work.    ED - stable with current treatment.    Reviewed and updated as needed this visit by clinical staff  Tobacco  Allergies  Meds  Problems         Reviewed and updated as needed " this visit by Provider  Allergies  Meds  Problems        Social History   Substance Use Topics     Smoking status: Former Smoker     Packs/day: 1.00     Years: 20.00     Types: Cigarettes     Smokeless tobacco: Never Used      Comment: quit smoking 30 yr ago     Alcohol use 0.0 oz/week     0 Cans of beer per week      Comment: occasional when going out for dinner                             Do you feel safe in your environment - Yes    Do you have a Health Care Directive?: Yes: Advance Directive has been received and scanned.    Current providers sharing in care for this patient include:   Patient Care Team:  Liliana Howard MD as PCP - General (Family Practice)    The following health maintenance items are reviewed in Epic and correct as of today:  Health Maintenance   Topic Date Due     FOOT EXAM Q1 YEAR  12/12/1951     EYE EXAM Q1 YEAR  12/12/1951     TSH W/ FREE T4 REFLEX Q2 YEAR  12/12/1951     MICROALBUMIN Q1 YEAR  12/12/1951     LIPID MONITORING Q1 YEAR  07/13/2016     FALL RISK ASSESSMENT  12/12/2017     PNEUMOCOCCAL (1 of 2 - PCV13) 12/12/2017     AORTIC ANEURYSM SCREENING (SYSTEM ASSIGNED)  12/12/2017     CREATININE Q1 YEAR  12/20/2017     A1C Q6 MO  02/17/2018     PHQ-2 Q1 YR  08/17/2018     INFLUENZA VACCINE (1) 09/01/2018     ADVANCE DIRECTIVE PLANNING Q5 YRS  01/06/2021     TETANUS IMMUNIZATION (SYSTEM ASSIGNED)  06/05/2024     COLON CANCER SCREEN (SYSTEM ASSIGNED)  08/29/2026     HIV SCREEN (SYSTEM ASSIGNED)  Addressed     HEPATITIS C SCREENING  Addressed     Patient Active Problem List   Diagnosis     Elevated blood pressure reading without diagnosis of hypertension     Unilateral inguinal hernia without obstruction or gangrene, recurrence not specified     CARDIOVASCULAR SCREENING; LDL GOAL LESS THAN 160     Advance Care Planning     First degree AV block     Prediabetes     C. difficile diarrhea     Rectal bleeding     Elevated prostate specific antigen (PSA)     Other male  "erectile dysfunction     Diabetes mellitus, type 2 (H)     Past Surgical History:   Procedure Laterality Date     COLONOSCOPY  2006     COLONOSCOPY WITH CO2 INSUFFLATION N/A 8/29/2016    Procedure: COLONOSCOPY WITH CO2 INSUFFLATION;  Surgeon: Tom Odom MD;  Location: [facility] OR     HERNIORRHAPHY INGUINAL Right 1/13/2016    Procedure: HERNIORRHAPHY INGUINAL;  Surgeon: Tom Odom MD;  Location: [facility] OR     TRANSANAL MINIMALLY INVASIVE SURGERY N/A 12/7/2016    Procedure: TRANSANAL MINIMALLY INVASIVE SURGERY;  Surgeon: Jaron Alan MD;  Location:  OR       Social History   Substance Use Topics     Smoking status: Former Smoker     Packs/day: 1.00     Years: 20.00     Types: Cigarettes     Smokeless tobacco: Never Used      Comment: quit smoking 30 yr ago     Alcohol use 0.0 oz/week     0 Cans of beer per week      Comment: occasional when going out for dinner     Family History   Problem Relation Age of Onset     Other Cancer Father 76     esophagus     Hypertension Mother      Osteoporosis Mother            ROS:  Constitutional, HEENT, cardiovascular, pulmonary, GI, , musculoskeletal, neuro, skin, endocrine and psych systems are negative, except as otherwise noted.    OBJECTIVE:   /86 (BP Location: Left arm)  Pulse (!) 47  Temp 97.9  F (36.6  C) (Oral)  Ht 6' 0.5\" (1.842 m)  Wt 234 lb (106.1 kg)  SpO2 93%  BMI 31.3 kg/m2 Estimated body mass index is 31.3 kg/(m^2) as calculated from the following:    Height as of this encounter: 6' 0.5\" (1.842 m).    Weight as of this encounter: 234 lb (106.1 kg).  EXAM:   GENERAL: healthy, alert, no distress and obese  EYES: Eyes grossly normal to inspection, PERRL and conjunctivae and sclerae normal  HENT: ear canals and TM's normal, nose and mouth without ulcers or lesions  NECK: no adenopathy, no asymmetry, masses, or scars and thyroid normal to palpation  RESP: lungs clear to auscultation - no rales, rhonchi or wheezes  CV: regular rate and " rhythm, normal S1 S2, no S3 or S4, no murmur, click or rub, no peripheral edema and peripheral pulses strong  ABDOMEN: soft, nontender, no hepatosplenomegaly, no masses and bowel sounds normal  MS: no gross musculoskeletal defects noted, no edema  SKIN: no suspicious lesions or rashes  NEURO: Normal strength and tone, mentation intact and speech normal  PSYCH: mentation appears normal, affect normal/bright    Diagnostic Test Results:  none     ASSESSMENT / PLAN:   1. Encounter for routine adult health examination without abnormal findings  Routine preventive discussed.  PSA done with outside labs.    2. Type 2 diabetes mellitus without complication, without long-term current use of insulin (H)  Uncertain control - check labs  - Hemoglobin A1c  - TSH with free T4 reflex  - Lipid panel reflex to direct LDL Fasting  - Comprehensive metabolic panel  - Albumin Random Urine Quantitative with Creat Ratio    3. Other male erectile dysfunction  Stable - refilled  - sildenafil (VIAGRA) 50 MG tablet; Take 0.5-1 tablets (25-50 mg) by mouth daily as needed (Take 30 min to 4 hours before intercourse.  Never use with nitroglycerin, terazosin or doxazosin.)  Dispense: 6 tablet; Refill: 11    4. Screening for HIV (human immunodeficiency virus)  refsued    5. Need for prophylactic vaccination and inoculation against influenza  refused    6. Need for prophylactic vaccination against Streptococcus pneumoniae (pneumococcus)  Refused.    Follow up for diabetes check in 6 months.      End of Life Planning:  Patient currently has an advanced directive: No.  I have verified the patient's ablity to prepare an advanced directive/make health care decisions.  Literature was provided to assist patient in preparing an advanced directive.    COUNSELING:  Reviewed preventive health counseling, as reflected in patient instructions    BP Readings from Last 1 Encounters:   11/07/18 144/86     Estimated body mass index is 31.3 kg/(m^2) as calculated  "from the following:    Height as of this encounter: 6' 0.5\" (1.842 m).    Weight as of this encounter: 234 lb (106.1 kg).      Weight management plan: Discussed healthy diet and exercise guidelines and patient will follow up in 12 months in clinic to re-evaluate.     reports that he has quit smoking. His smoking use included Cigarettes. He has a 20.00 pack-year smoking history. He has never used smokeless tobacco.      Appropriate preventive services were discussed with this patient, including applicable screening as appropriate for cardiovascular disease, diabetes, osteopenia/osteoporosis, and glaucoma.  As appropriate for age/gender, discussed screening for colorectal cancer, prostate cancer, breast cancer, and cervical cancer. Checklist reviewing preventive services available has been given to the patient.    Reviewed patients plan of care and provided an AVS. The Intermediate Care Plan ( asthma action plan, low back pain action plan, and migraine action plan) for Juwan meets the Care Plan requirement. This Care Plan has been established and reviewed with the Patient.    Counseling Resources:  ATP IV Guidelines  Pooled Cohorts Equation Calculator  Breast Cancer Risk Calculator  FRAX Risk Assessment  ICSI Preventive Guidelines  Dietary Guidelines for Americans, 2010  [employer]'s MyPlate  ASA Prophylaxis  Lung CA Screening    Liliana Sam MD  [facility]  "

## 2018-11-09 NOTE — PROGRESS NOTES
Please call.    Blood sugars have significantly worsened and he should consider a medication to treat this.  He also needs to decrease his bread, rice, pasta and sugar intake.  If not interested in medication, then please follow up with me in 3 months after making diet changes so we can recheck his labs.  He is also leaking some protein in his urine which is likely due to his blood sugars.  He doesn't need to decrease his protein intake but should decrease his sugar as able.  Triglycerides are also elevated due to sugar.  Liver function is stable.    Liliana Sam

## 2019-01-23 ENCOUNTER — MYC MEDICAL ADVICE (OUTPATIENT)
Dept: FAMILY MEDICINE | Facility: CLINIC | Age: 69
End: 2019-01-23

## 2019-01-30 ENCOUNTER — MYC MEDICAL ADVICE (OUTPATIENT)
Dept: FAMILY MEDICINE | Facility: CLINIC | Age: 69
End: 2019-01-30

## 2019-11-03 ENCOUNTER — HEALTH MAINTENANCE LETTER (OUTPATIENT)
Age: 69
End: 2019-11-03

## 2020-02-10 ENCOUNTER — HEALTH MAINTENANCE LETTER (OUTPATIENT)
Age: 70
End: 2020-02-10

## 2020-11-16 ENCOUNTER — HEALTH MAINTENANCE LETTER (OUTPATIENT)
Age: 70
End: 2020-11-16

## 2021-04-03 ENCOUNTER — HEALTH MAINTENANCE LETTER (OUTPATIENT)
Age: 71
End: 2021-04-03

## 2021-05-29 ENCOUNTER — HEALTH MAINTENANCE LETTER (OUTPATIENT)
Age: 71
End: 2021-05-29

## 2021-07-23 ENCOUNTER — VIRTUAL VISIT (OUTPATIENT)
Dept: URGENT CARE | Facility: CLINIC | Age: 71
End: 2021-07-23
Payer: COMMERCIAL

## 2021-07-23 ENCOUNTER — TELEPHONE (OUTPATIENT)
Dept: FAMILY MEDICINE | Facility: CLINIC | Age: 71
End: 2021-07-23

## 2021-07-23 DIAGNOSIS — U07.1 INFECTION DUE TO 2019 NOVEL CORONAVIRUS: Primary | ICD-10-CM

## 2021-07-23 PROCEDURE — 99213 OFFICE O/P EST LOW 20 MIN: CPT | Mod: GT

## 2021-07-23 NOTE — PROGRESS NOTES
Assessment & Plan     1. Infection due to 2019 novel coronavirus  Discussed with pt indications to seek care in the ED.    I would recommend he obtain an O2 sat monitor for close observation of his O2 sats and if 90 % or less, chest pain, not able to participate in ADLS,  to go to the ED.    Encouraged hydration, should go to the ED for signs of dehydration.   I discussed if he felt he was worsening and had concerns for possible denyhdation it would be appropriate to go to the ED tonight or this weekend but he defers and states he would like to continue to monitor.    - COVID-19 GetWell Loop Referral placed   Should be monitored very closely at home and encouraged wife to take him to the ED should he be worsening.        Virtual Urgent Care  [facility] VIRTUAL URGENT CARE    Subjective   Pt seen for scheduled video visit due to Covid 19.    Start Time 6:25 pm    End time 6:35 pm   Provider location  Clinic office in [place]  Patient location: home in [place]     Jaskaran is a 70 year old male who presents today for the following health issues:  Chief Complaint   Patient presents with     Covid Concern   PT was diagnosed with a positive covid 19 test this week Tuesday 20th but has been symptomatic since Tuesday 13th (today is day 11).  He has hx of diabetes, htn, first degree AV block.    He has been in bed most of the day this week, ongoing fevers, cough, congestion but denies any sob at rest. Does have significant fatigue and weakness, sob with exertion.  He does not have a way to check his O2 sat at home.  NO vomiting or diarrhea.  He is drinking fluids well, eating well per pt and wife agrees. He is voiding 5-6 x per day.  He reports getting to the bathroom and back without difficulty but wife feels he is having more difficulty than he is reporting.  No chest pain.      They initially called with questions re: monoclonal antibody options.  He however is on day 11 of illness and it is strictly available to be given on  day 10 or less through the [place] [employer], thus not able to even submit his name for consideration.      Review of Systems  Constitutional, HEENT, cardiovascular, pulmonary, gi and gu systems are negative, except as otherwise noted.      Objective    There were no vitals taken for this visit.  Physical Exam   Pt is laying in bed, appears comfortable and in no distress but fatigued.  He answers questions appropriately and able to talk in full sentacnes without obvious dyspnea or tachypnea

## 2021-07-23 NOTE — TELEPHONE ENCOUNTER
There is a very specific criteria for when monoclonal antibodies are indicated and I cannot make that determination without a comprehensive evaluation.   I would recommend an evaluation.  If they develop increased shortness of breath, fever, emesis, decreased urine output then need to be seen in the emergency room.   Thank you,  Bettina Healy MD MPH

## 2021-07-23 NOTE — PATIENT INSTRUCTIONS
"Patient Education   Discharge Instructions for COVID-19 Patients  You have--or may have--COVID-19. Please follow the instructions listed below.   If you have a weakened immune system, discuss with your doctor any other actions you need to take.  How can I protect others?  If you have symptoms (fever, cough, body aches or trouble breathing):    Stay home and away from others (self-isolate) until:  ? Your other symptoms have resolved (gotten better). And   ? You've had no fever--and no medicine that reduces fever--for 1 full day (24 hours). And   ? At least 10 days have passed since your symptoms started. (You may need to wait 20 days. Follow the advice of your care team.)  If you don't show symptoms, but testing showed that you have COVID-19:    Stay home and away from others (self-isolate) until at least 10 days have passed since the date of your first positive COVID-19 test.  During this time    Stay in your own room, even for meals. Use your own bathroom if you can.    Stay away from others in your home. No hugging, kissing or shaking hands. No visitors.    Don't go to work, school or anywhere else.    Clean \"high touch\" surfaces often (doorknobs, counters, handles). Use household cleaning spray or wipes.    You'll find a full list of  on the EPA website: www.epa.gov/pesticide-registration/list-n-disinfectants-use-against-sars-cov-2.    Cover your mouth and nose with a mask or other face covering to avoid spreading germs.    Wash your hands and face often. Use soap and water.    Caregivers in these groups are at risk for severe illness due to COVID-19:  ? People 65 years and older  ? People who live in a nursing home or long-term care facility  ? People with chronic disease (lung, heart, cancer, diabetes, kidney, liver, immunologic)  ? People who have a weakened immune system, including those who:    Are in cancer treatment    Take medicine that weakens the immune system, such as corticosteroids    Had a " bone marrow or organ transplant    Have an immune deficiency    Have poorly controlled HIV or AIDS    Are obese (body mass index of 40 or higher)    Smoke regularly    Caregivers should wear gloves while washing dishes, handling laundry and cleaning bedrooms and bathrooms.    Use caution when washing and drying laundry: Don't shake dirty laundry and use the warmest water setting that you can.    For more tips on managing your health at home, go to www.cdc.gov/coronavirus/2019-ncov/downloads/10Things.pdf.  How can I take care of myself at home?  1. Get lots of rest. Drink extra fluids (unless a doctor has told you not to).  2. Take Tylenol (acetaminophen) for fever or pain. If you have liver or kidney problems, ask your family doctor if it's okay to take Tylenol.   Adults can take either:   ? 650 mg (two 325 mg pills) every 4 to 6 hours, or   ? 1,000 mg (two 500 mg pills) every 8 hours as needed.  ? Note: Don't take more than 3,000 mg in one day. Acetaminophen is found in many medicines (both prescribed and over-the-counter medicines). Read all labels to be sure you don't take too much.   For children, check the Tylenol bottle for the right dose. The dose is based on the child's age or weight.  3. If you have other health problems (like cancer, heart failure, an organ transplant or severe kidney disease): Call your specialty clinic if you don't feel better in the next 2 days.  4. Know when to call 911. Emergency warning signs include:  ? Trouble breathing or shortness of breath  ? Pain or pressure in the chest that doesn't go away  ? Feeling confused like you haven't felt before, or not being able to wake up  ? Bluish-colored lips or face  5. Your doctor may have prescribed a blood thinner medicine. Follow their instructions.  Where can I get more information?     [facility] - About COVID-19:   [email or web address]    CDC - What to Do If You're Sick:  www.cdc.gov/coronavirus/2019-ncov/about/steps-when-sick.html    CDC - Ending Home Isolation: www.cdc.gov/coronavirus/2019-ncov/hcp/disposition-in-home-patients.html    CDC - Caring for Someone: www.cdc.gov/coronavirus/2019-ncov/if-you-are-sick/care-for-someone.html    [facility] - Interim Guidance for Hospital Discharge to Home: www.health.[facility].[place]./diseases/coronavirus/hcp/hospdischarge.pdf    Below are the COVID-19 hotlines at the [place] Department of Health ([facility]). Interpreters are available.  ? For health questions: Call 243-616-6558 or 1-954.136.9682 (7 a.m. to 7 p.m.)  ? For questions about schools and childcare: Call 476-991-8454 or 1-319.280.9706 (7 a.m. to 7 p.m.)    For informational purposes only. Not to replace the advice of your health care provider. Clinically reviewed by Dr. David Donaldson.   Copyright   2020 [facility]. All rights reserved. [place] [employer] 655331 - REV 01/05/21.

## 2021-07-23 NOTE — TELEPHONE ENCOUNTER
Patient and wife calling (both were on the phone).    They both have COVID19 but they are more concerned about patient and his symptoms.  Patient was diagnosed with COVID19 on 7/20/2021 but started having symptoms the week prior.  Has cough, dizziness, lack of appetites.  SOB with exertion only, otherwise, breathing is fine at rest.  Does not have a thermometer but feels feverish on and off.    They are wondering if treatment with monoclonal antibodies would be an option for him right now  Patient has not been seen in over 2 years.  Did explain that an appointment (at least a virtual visit) is needed before provider can orders anything  They will schedule a virtual visit if provider feels monoclonal antibodies is a possible option for him right now    They are aware that Liliana Avila is not in the office today and asked for message to be sent to covering provider as well   Did advise to go to the ER if having any breathing difficulties especially at rest.    Raulito Valdovinos RN  [facility]

## 2021-07-23 NOTE — TELEPHONE ENCOUNTER
Agree, scheduled virtual visit and antibody treatment can be ordered if appropriate.    Liliana Rossi M.D.

## 2021-07-23 NOTE — TELEPHONE ENCOUNTER
Called patient and his wife on speaker.  Gave the messages from both Dr. Healy and Dr. Rossi.  They both agree to plan    Assisted in scheduling this patient with   Virtual visit  Urgent Care.    Wendy Velasquez RN, [facility]

## 2021-07-24 ENCOUNTER — TRANSFERRED RECORDS (OUTPATIENT)
Dept: HEALTH INFORMATION MANAGEMENT | Facility: CLINIC | Age: 71
End: 2021-07-24

## 2021-07-25 LAB — EJECTION FRACTION: NORMAL %

## 2021-08-03 NOTE — NURSING NOTE
"Chief Complaint   Patient presents with     Urgent Care     Ear Problem     pt states x 3 days has sore in right ear. States has cleaned out ear and is now having more pain in ear.        Initial /90 (BP Location: Right arm, Patient Position: Chair, Cuff Size: Adult Large)  Pulse 51  Temp 97  F (36.1  C) (Tympanic)  Wt 225 lb (102.1 kg)  SpO2 97%  BMI 29.19 kg/m2 Estimated body mass index is 29.19 kg/(m^2) as calculated from the following:    Height as of 1/4/17: 6' 1.62\" (1.87 m).    Weight as of this encounter: 225 lb (102.1 kg).  Medication Reconciliation: unable or not appropriate to perform   Belinda Jiménez CMA (AAMA) 2/23/2017 12:05 PM    " Less than or equal to Normal Limit

## 2021-08-09 ENCOUNTER — TELEPHONE (OUTPATIENT)
Dept: FAMILY MEDICINE | Facility: CLINIC | Age: 71
End: 2021-08-09

## 2021-08-09 NOTE — TELEPHONE ENCOUNTER
I will not be able to complete these without a visit.  I have not seen him for over 2 years.  Visit can be virtual.    Liliana Rossi M.D.

## 2021-08-18 ENCOUNTER — OFFICE VISIT (OUTPATIENT)
Dept: FAMILY MEDICINE | Facility: CLINIC | Age: 71
End: 2021-08-18
Payer: COMMERCIAL

## 2021-08-18 VITALS
TEMPERATURE: 98.5 F | SYSTOLIC BLOOD PRESSURE: 138 MMHG | OXYGEN SATURATION: 97 % | HEART RATE: 62 BPM | DIASTOLIC BLOOD PRESSURE: 80 MMHG | BODY MASS INDEX: 27.7 KG/M2 | HEIGHT: 73 IN | WEIGHT: 209 LBS | RESPIRATION RATE: 20 BRPM

## 2021-08-18 DIAGNOSIS — U07.1 PNEUMONIA DUE TO 2019 NOVEL CORONAVIRUS: Primary | ICD-10-CM

## 2021-08-18 DIAGNOSIS — J12.82 PNEUMONIA DUE TO 2019 NOVEL CORONAVIRUS: Primary | ICD-10-CM

## 2021-08-18 DIAGNOSIS — L30.9 DERMATITIS: ICD-10-CM

## 2021-08-18 DIAGNOSIS — E11.9 TYPE 2 DIABETES MELLITUS WITHOUT COMPLICATION, WITHOUT LONG-TERM CURRENT USE OF INSULIN (H): ICD-10-CM

## 2021-08-18 PROCEDURE — 99214 OFFICE O/P EST MOD 30 MIN: CPT | Performed by: FAMILY MEDICINE

## 2021-08-18 RX ORDER — METRONIDAZOLE 7.5 MG/G
GEL TOPICAL 2 TIMES DAILY
Qty: 45 G | Refills: 0 | Status: SHIPPED | OUTPATIENT
Start: 2021-08-18 | End: 2022-01-12

## 2021-08-18 ASSESSMENT — MIFFLIN-ST. JEOR: SCORE: 1763.96

## 2021-08-18 ASSESSMENT — PAIN SCALES - GENERAL: PAINLEVEL: NO PAIN (0)

## 2021-08-18 NOTE — PROGRESS NOTES
"    Assessment & Plan     Pneumonia due to 2019 novel coronavirus  Improved - continue to monitor    Type 2 diabetes mellitus without complication, without long-term current use of insulin (H)  Not controlled and medication refused.  Discussed CV risks but patient refuses sugar treatment or statin.    Dermatitis  Looks like rosacea  - trial of topical  - metroNIDAZOLE (METROGEL) 0.75 % external gel; Apply topically 2 times daily To rash on cheeks.       BMI:   Estimated body mass index is 27.96 kg/m  as calculated from the following:    Height as of this encounter: 1.842 m (6' 0.5\").    Weight as of this encounter: 94.8 kg (209 lb).       The uses and side effects, including black box warnings as appropriate, were discussed in detail.  All patient questions were answered.  The patient was instructed to call immediately if any side effects developed.     Return in about 2 weeks (around 9/1/2021), or if symptoms worsen or fail to improve.    Liliana Sam MD  [facility]    Navarro Jessica is a 70 year old who presents for the following health issues     HPI     [employer] and  for work and needs forms completed.  Developed symptoms 7/13 and hospitalized on 7/24 with discharge 8/4/21. Has home oxygen and using prn.      Review of Systems   Constitutional, HEENT, cardiovascular, pulmonary, gi and gu systems are negative, except as otherwise noted.      Objective    /80 (BP Location: Left arm)   Pulse 62   Temp 98.5  F (36.9  C) (Tympanic)   Resp 20   Ht 1.842 m (6' 0.5\")   Wt 94.8 kg (209 lb)   SpO2 97%   BMI 27.96 kg/m    Body mass index is 27.96 kg/m .  Physical Exam   GENERAL: healthy, alert and no distress  NECK: no adenopathy, no asymmetry, masses, or scars and thyroid normal to palpation  RESP: lungs clear to auscultation - no rales, rhonchi or wheezes  CV: regular rate and rhythm, normal S1 S2, no S3 or S4, no murmur, click or rub, no peripheral " edema and peripheral pulses strong  ABDOMEN: soft, nontender, no hepatosplenomegaly, no masses and bowel sounds normal  MS: no gross musculoskeletal defects noted, no edema

## 2021-08-18 NOTE — TELEPHONE ENCOUNTER
Received signed [employer] for Duty Certification and FMLA forms and faxed to [facility] Leave [employer], [phone], right fax confirmed at 2:21 pm today, 8/18/2021. Copy to TC and abstracting.  Jacquelyn Lucero MA  [facility]  2nd Floor  Primary Care

## 2021-08-18 NOTE — PATIENT INSTRUCTIONS
At [facility], we strive to deliver an exceptional experience to you, every time we see you. If you receive a survey, please complete it as we do value your feedback.  If you have MyChart, you can expect to receive results automatically within 24 hours of their completion.  Your provider will send a note interpreting your results as well.   If you do not have MyChart, you should receive your results in about a week by mail.    Your care team:                            Family Medicine Internal Medicine   MD Robin Dubois MD Shantel Branch-Fleming, MD Srinivasa Vaka, MD Katya Belousova, PAITALIA Shukla, APRN CNP    Jayme Fierro, MD Pediatrics   Haider Quiles, PAITALIA Person, CNP MD Tessa Daugherty APRN CNP   MD Irene Espinosa MD Deborah Mielke, MD Gail Rausch, APRN [facility]      Clinic hours: Monday - Thursday 7 am-6 pm; Fridays 7 am-5 pm.   Urgent care: Monday - Friday 10 am- 8 pm; Saturday and Sunday 9 am-5 pm.    Clinic: [phone]       [facility]: Monday - Thursday 8 am - 7 pm; Friday 8 am - 6 pm  [facility]: [phone]     Use www.[email or web address].org for 24/7 diagnosis and treatment of dozens of conditions.

## 2021-09-18 ENCOUNTER — HEALTH MAINTENANCE LETTER (OUTPATIENT)
Age: 71
End: 2021-09-18

## 2021-10-05 PROBLEM — J12.82 PNEUMONIA DUE TO COVID-19 VIRUS: Status: ACTIVE | Noted: 2021-07-24

## 2021-10-05 PROBLEM — U07.1 PNEUMONIA DUE TO COVID-19 VIRUS: Status: ACTIVE | Noted: 2021-07-24

## 2021-10-05 PROBLEM — E78.5 HYPERLIPIDEMIA WITH TARGET LOW DENSITY LIPOPROTEIN (LDL) CHOLESTEROL LESS THAN 100 MG/DL: Status: ACTIVE | Noted: 2021-10-05

## 2021-10-06 ENCOUNTER — ANCILLARY PROCEDURE (OUTPATIENT)
Dept: GENERAL RADIOLOGY | Facility: CLINIC | Age: 71
End: 2021-10-06
Attending: PODIATRIST
Payer: COMMERCIAL

## 2021-10-06 ENCOUNTER — OFFICE VISIT (OUTPATIENT)
Dept: PODIATRY | Facility: CLINIC | Age: 71
End: 2021-10-06
Payer: COMMERCIAL

## 2021-10-06 VITALS
DIASTOLIC BLOOD PRESSURE: 77 MMHG | HEART RATE: 66 BPM | WEIGHT: 209 LBS | BODY MASS INDEX: 27.96 KG/M2 | SYSTOLIC BLOOD PRESSURE: 147 MMHG

## 2021-10-06 DIAGNOSIS — M77.9 BONE SPUR: ICD-10-CM

## 2021-10-06 DIAGNOSIS — M20.41 HAMMER TOE OF RIGHT FOOT: Primary | ICD-10-CM

## 2021-10-06 DIAGNOSIS — L84 CALLUS: ICD-10-CM

## 2021-10-06 DIAGNOSIS — M20.41 HAMMER TOE OF RIGHT FOOT: ICD-10-CM

## 2021-10-06 PROCEDURE — 99202 OFFICE O/P NEW SF 15 MIN: CPT | Performed by: PODIATRIST

## 2021-10-06 PROCEDURE — 73630 X-RAY EXAM OF FOOT: CPT | Mod: RT | Performed by: RADIOLOGY

## 2021-10-06 NOTE — PROGRESS NOTES
Subjective:    Pt is seen today as a new pt referral with the c/c of painful right fifth toe callus..  This has been symptomatic for the past several years and has recently become worse.   Patient points to medial fifth toe describes this as a burning pain.  Aggravated by activity and releaved by rest.  Patient is walking many miles a day at work as an .  His diabetes mellitus.  He denies numbness or tingling.  He denies erythema edema or drainage.  No pain when walking barefoot around the house.    ROS:  See above.       No Known Allergies    Current Outpatient Medications   Medication Sig Dispense Refill     metroNIDAZOLE (METROGEL) 0.75 % external gel Apply topically 2 times daily To rash on cheeks. 45 g 0       Patient Active Problem List   Diagnosis     Elevated blood pressure reading without diagnosis of hypertension     Unilateral inguinal hernia without obstruction or gangrene, recurrence not specified     CARDIOVASCULAR SCREENING; LDL GOAL LESS THAN 160     Advance Care Planning     First degree AV block     Prediabetes     C. difficile diarrhea     Rectal bleeding     Elevated prostate specific antigen (PSA)     Other male erectile dysfunction     Diabetes mellitus, type 2 (H)     Pneumonia due to COVID-19 virus     Hyperlipidemia with target low density lipoprotein (LDL) cholesterol less than 100 mg/dL       Past Medical History:   Diagnosis Date     First degree AV block 1/6/2016     Hypertension     white coat syndrome     Rectal polyp        Past Surgical History:   Procedure Laterality Date     COLONOSCOPY  2006     COLONOSCOPY WITH CO2 INSUFFLATION N/A 8/29/2016    Procedure: COLONOSCOPY WITH CO2 INSUFFLATION;  Surgeon: Tom Odom MD;  Location: [facility] OR     HERNIORRHAPHY INGUINAL Right 1/13/2016    Procedure: HERNIORRHAPHY INGUINAL;  Surgeon: Tom Odom MD;  Location:  OR     TRANSANAL MINIMALLY INVASIVE SURGERY N/A 12/7/2016    Procedure: TRANSANAL MINIMALLY  INVASIVE SURGERY;  Surgeon: Jaron Alan MD;  Location:  OR       Family History   Problem Relation Age of Onset     Other Cancer Father 76        esophagus     Hypertension Mother      Osteoporosis Mother        Social History     Tobacco Use     Smoking status: Former Smoker     Packs/day: 1.00     Years: 20.00     Pack years: 20.00     Types: Cigarettes     Smokeless tobacco: Never Used     Tobacco comment: quit smoking 30 yr ago   Substance Use Topics     Alcohol use: Yes     Alcohol/week: 0.0 standard drinks     Comment: occasional when going out for dinner         Objective:  BP (!) 147/77   Pulse 66   Wt 94.8 kg (209 lb)   BMI 27.96 kg/m  .      Constitutional/ general:  Pt is in no apparent distress, appears well-nourished.  Cooperative with history and physical exam.     Psych:  The patient answered questions appropriately.  Normal affect.  Seems to have reasonable expectations, in terms of treatment.     Lungs:  Non labored breathing, non labored speech. No cough.  No audible wheezing. Even, quiet breathing.       Vascular:  Pedal pulses are palpable bilaterally for both the DP and PT arteries.  CFT < 3 sec. ankle edema and varicosities noted.  Pedal hair growth noted.     Neuro:  Alert and oriented x 3. Coordinated gait.  Light touch sensation is intact    Derm: Normal texture and turgor.  No erythema, ecchymosis, or cyanosis.  No open lesions.     Musculoskeletal:    Lower extremity muscle strength is normal.  Patient is ambulatory without an assistive device or brace  Normal arch with weightbearing.  No forefoot or rear foot deformities noted.  MS 5/5 all compartments.  Normal ROM all forefoot and rearfoot joints.  No equinus.  Right fourth and fifth toes hammered with slight lateral deviation.  Fourth toe somewhat on top of the fifth toe.  Medial portion of the fifth toe has callus.  Underlying healthy tissue noted with debridement.    X-rays reveal signs consistent with hammered right fourth  and fifth toes that are laterally deviated.  Bone spur noted on medial right fifth toe base distal phalanx    A/P   Hammertoe   Bone spur   Right fifth toe callus    Callus/calluses debrided with a fifteen blade.  X-rays were taken of the right foot.  We pointed out the numerous mechanical causes of this.  Patient will wear good stiff shoes at all times and discussed how this will help offload this.  Patient to keep this down with a pumice stone.  We gave him a toe  to keep this offloaded.  RTC prn.     Jude Jean-Baptiste, ALICIA, FACFAS

## 2021-10-06 NOTE — PATIENT INSTRUCTIONS
We wish you continued good healing. If you have any questions or concerns, please do not hesitate to contact us at  [phone]    [employer] (secure e-mail communication and access to your chart) to send a message or to make an appointment.    Please remember to call and schedule a follow up appointment if one was recommended at your earliest convenience.     PODIATRY CLINIC HOURS  TELEPHONE NUMBER    Dr. Jude LÓPEZPANA [facility]        Clinics:  [place]unaway CMA   Tuesday 1PM-6PM  [place]  Wednesday 745AM-330PM  [place]  Thursday/Friday 745AM-230PM  [place] [facility]/[place] APPOINTMENTS  [phone]    [place] APPOINTMENTS  [phone]          If you need a medication refill, please contact us you may need lab work and/or a follow up visit prior to your refill (i.e. Antifungal medications).    If MRI needed please call Imaging at [phone] or [phone]    HOW DO I GET MY KNEE SCOOTER? Knee scooters can be picked up at ANY Medical Supply stores with your knee scooter Prescription.  OR    Bring your signed prescription to an [facility] Medical [facility] showroom.

## 2021-10-06 NOTE — LETTER
10/6/2021         RE: Juwan Butt  [address] [place] [address]        Dear Colleague,    Thank you for referring your patient, Juwan Butt, to the [facility]. Please see a copy of my visit note below.    Subjective:    Pt is seen today as a new pt referral with the c/c of painful right fifth toe callus..  This has been symptomatic for the past several years and has recently become worse.   Patient points to medial fifth toe describes this as a burning pain.  Aggravated by activity and releaved by rest.  Patient is walking many miles a day at work as an .  His diabetes mellitus.  He denies numbness or tingling.  He denies erythema edema or drainage.  No pain when walking barefoot around the house.    ROS:  See above.       No Known Allergies    Current Outpatient Medications   Medication Sig Dispense Refill     metroNIDAZOLE (METROGEL) 0.75 % external gel Apply topically 2 times daily To rash on cheeks. 45 g 0       Patient Active Problem List   Diagnosis     Elevated blood pressure reading without diagnosis of hypertension     Unilateral inguinal hernia without obstruction or gangrene, recurrence not specified     CARDIOVASCULAR SCREENING; LDL GOAL LESS THAN 160     Advance Care Planning     First degree AV block     Prediabetes     C. difficile diarrhea     Rectal bleeding     Elevated prostate specific antigen (PSA)     Other male erectile dysfunction     Diabetes mellitus, type 2 (H)     Pneumonia due to COVID-19 virus     Hyperlipidemia with target low density lipoprotein (LDL) cholesterol less than 100 mg/dL       Past Medical History:   Diagnosis Date     First degree AV block 1/6/2016     Hypertension     white coat syndrome     Rectal polyp        Past Surgical History:   Procedure Laterality Date     COLONOSCOPY  2006     COLONOSCOPY WITH CO2 INSUFFLATION N/A 8/29/2016    Procedure: COLONOSCOPY WITH CO2 INSUFFLATION;  Surgeon: Tom Odom  MD Jude;  Location: [facility] OR     HERNIORRHAPHY INGUINAL Right 1/13/2016    Procedure: HERNIORRHAPHY INGUINAL;  Surgeon: Tom Odom MD;  Location: [facility] OR     TRANSANAL MINIMALLY INVASIVE SURGERY N/A 12/7/2016    Procedure: TRANSANAL MINIMALLY INVASIVE SURGERY;  Surgeon: Jaron Alan MD;  Location: [facility] OR       Family History   Problem Relation Age of Onset     Other Cancer Father 76        esophagus     Hypertension Mother      Osteoporosis Mother        Social History     Tobacco Use     Smoking status: Former Smoker     Packs/day: 1.00     Years: 20.00     Pack years: 20.00     Types: Cigarettes     Smokeless tobacco: Never Used     Tobacco comment: quit smoking 30 yr ago   Substance Use Topics     Alcohol use: Yes     Alcohol/week: 0.0 standard drinks     Comment: occasional when going out for dinner         Objective:  BP (!) 147/77   Pulse 66   Wt 94.8 kg (209 lb)   BMI 27.96 kg/m  .      Constitutional/ general:  Pt is in no apparent distress, appears well-nourished.  Cooperative with history and physical exam.     Psych:  The patient answered questions appropriately.  Normal affect.  Seems to have reasonable expectations, in terms of treatment.     Lungs:  Non labored breathing, non labored speech. No cough.  No audible wheezing. Even, quiet breathing.       Vascular:  Pedal pulses are palpable bilaterally for both the DP and PT arteries.  CFT < 3 sec. ankle edema and varicosities noted.  Pedal hair growth noted.     Neuro:  Alert and oriented x 3. Coordinated gait.  Light touch sensation is intact    Derm: Normal texture and turgor.  No erythema, ecchymosis, or cyanosis.  No open lesions.     Musculoskeletal:    Lower extremity muscle strength is normal.  Patient is ambulatory without an assistive device or brace  Normal arch with weightbearing.  No forefoot or rear foot deformities noted.  MS 5/5 all compartments.  Normal ROM all forefoot and rearfoot joints.  No equinus.  Right fourth and  fifth toes hammered with slight lateral deviation.  Fourth toe somewhat on top of the fifth toe.  Medial portion of the fifth toe has callus.  Underlying healthy tissue noted with debridement.    X-rays reveal signs consistent with hammered right fourth and fifth toes that are laterally deviated.  Bone spur noted on medial right fifth toe base distal phalanx    A/P   Hammertoe   Bone spur   Right fifth toe callus    Callus/calluses debrided with a fifteen blade.  X-rays were taken of the right foot.  We pointed out the numerous mechanical causes of this.  Patient will wear good stiff shoes at all times and discussed how this will help offload this.  Patient to keep this down with a pumice stone.  We gave him a toe  to keep this offloaded.  RTC prn.     Jude Jean-Baptiste DPM, FACFAS            Again, thank you for allowing me to participate in the care of your patient.        Sincerely,        Jude Jean-Baptiste DPM

## 2021-12-08 ENCOUNTER — OFFICE VISIT (OUTPATIENT)
Dept: FAMILY MEDICINE | Facility: CLINIC | Age: 71
End: 2021-12-08
Payer: COMMERCIAL

## 2021-12-08 VITALS
OXYGEN SATURATION: 99 % | WEIGHT: 220 LBS | TEMPERATURE: 96.4 F | BODY MASS INDEX: 29.43 KG/M2 | SYSTOLIC BLOOD PRESSURE: 160 MMHG | HEART RATE: 50 BPM | DIASTOLIC BLOOD PRESSURE: 82 MMHG

## 2021-12-08 DIAGNOSIS — E11.9 TYPE 2 DIABETES MELLITUS WITHOUT COMPLICATION, WITHOUT LONG-TERM CURRENT USE OF INSULIN (H): ICD-10-CM

## 2021-12-08 DIAGNOSIS — I10 HYPERTENSION GOAL BP (BLOOD PRESSURE) < 140/90: ICD-10-CM

## 2021-12-08 DIAGNOSIS — K62.89 RECTAL MASS: ICD-10-CM

## 2021-12-08 DIAGNOSIS — N50.89 MASS OF LEFT TESTICLE: Primary | ICD-10-CM

## 2021-12-08 LAB
ANION GAP SERPL CALCULATED.3IONS-SCNC: 5 MMOL/L (ref 3–14)
BUN SERPL-MCNC: 16 MG/DL (ref 7–30)
CALCIUM SERPL-MCNC: 9.4 MG/DL (ref 8.5–10.1)
CHLORIDE BLD-SCNC: 104 MMOL/L (ref 94–109)
CHOLEST SERPL-MCNC: 216 MG/DL
CO2 SERPL-SCNC: 28 MMOL/L (ref 20–32)
CREAT SERPL-MCNC: 0.84 MG/DL (ref 0.66–1.25)
FASTING STATUS PATIENT QL REPORTED: NO
GFR SERPL CREATININE-BSD FRML MDRD: 90 ML/MIN/1.73M2
GLUCOSE BLD-MCNC: 186 MG/DL (ref 70–99)
HBA1C MFR BLD: 11.1 % (ref 0–5.6)
HDLC SERPL-MCNC: 40 MG/DL
LDLC SERPL CALC-MCNC: 109 MG/DL
NONHDLC SERPL-MCNC: 176 MG/DL
POTASSIUM BLD-SCNC: 4.4 MMOL/L (ref 3.4–5.3)
SODIUM SERPL-SCNC: 137 MMOL/L (ref 133–144)
TRIGL SERPL-MCNC: 335 MG/DL

## 2021-12-08 PROCEDURE — 82043 UR ALBUMIN QUANTITATIVE: CPT | Performed by: FAMILY MEDICINE

## 2021-12-08 PROCEDURE — 83036 HEMOGLOBIN GLYCOSYLATED A1C: CPT | Performed by: FAMILY MEDICINE

## 2021-12-08 PROCEDURE — 99214 OFFICE O/P EST MOD 30 MIN: CPT | Performed by: FAMILY MEDICINE

## 2021-12-08 PROCEDURE — 80061 LIPID PANEL: CPT | Performed by: FAMILY MEDICINE

## 2021-12-08 PROCEDURE — 36415 COLL VENOUS BLD VENIPUNCTURE: CPT | Performed by: FAMILY MEDICINE

## 2021-12-08 PROCEDURE — 80048 BASIC METABOLIC PNL TOTAL CA: CPT | Performed by: FAMILY MEDICINE

## 2021-12-08 RX ORDER — FLASH GLUCOSE SENSOR
1 KIT MISCELLANEOUS
Qty: 2 EACH | Refills: 5 | Status: SHIPPED | OUTPATIENT
Start: 2021-12-08 | End: 2022-07-18

## 2021-12-08 RX ORDER — FLASH GLUCOSE SCANNING READER
1 EACH MISCELLANEOUS ONCE
Qty: 1 EACH | Refills: 0 | Status: SHIPPED | OUTPATIENT
Start: 2021-12-08 | End: 2021-12-08

## 2021-12-08 NOTE — PROGRESS NOTES
Assessment & Plan     Mass of left testicle  Possible etiologies discussed - check ultrasound.  - US Testicular & Scrotum w Doppler Ltd; Future    Type 2 diabetes mellitus without complication, without long-term current use of insulin (H)  Uncontrolled - medication refused, check labs and low carb diet recommended  - HEMOGLOBIN A1C; Future  - BASIC METABOLIC PANEL; Future  - Lipid panel reflex to direct LDL Non-fasting; Future  - Albumin Random Urine Quantitative with Creat Ratio; Future  - Continuous Blood Gluc  (FREESTYLE MILAN 14 DAY READER) BELTRAN; 1 each once for 1 dose Use to read blood sugars as per 's instructions.  - Continuous Blood Gluc Sensor (FREESTYLE MILAN 14 DAY SENSOR) MISC; 1 each every 14 days Change every 14 days.  - HEMOGLOBIN A1C  - BASIC METABOLIC PANEL  - Lipid panel reflex to direct LDL Non-fasting  - Albumin Random Urine Quantitative with Creat Ratio    Rectal mass  5 year follow up colonoscopy  - Adult Gastro Ref - Procedure Only; Future    Hypertension goal BP (blood pressure) < 140/90  Not controlled - medication refused.    The uses and side effects, including black box warnings as appropriate, were discussed in detail.  All patient questions were answered.  The patient was instructed to call immediately if any side effects developed.     Return in about 2 weeks (around 12/22/2021), or if symptoms worsen or fail to improve.    Liliana Sam MD  [facility]    Navarro Jessica is a 70 year old who presents for the following health issues     Mass    STD    History of Present Illness       He eats 4 or more servings of fruits and vegetables daily.He consumes 0 sweetened beverage(s) daily.He exercises with enough effort to increase his heart rate 30 to 60 minutes per day.  He exercises with enough effort to increase his heart rate 6 days per week.        History of rectal mass  DM - refused medication and not doing well with  diet.  HTN  Penile rash that comes and goes over the last few months. Using OTC medication which helps.  Left testicular mass for about 1 month.  Smaller now but still present.      Review of Systems   Constitutional, HEENT, cardiovascular, pulmonary, gi and gu systems are negative, except as otherwise noted.      Objective    BP (!) 160/82 (BP Location: Left arm)   Pulse 50   Temp (!) 96.4  F (35.8  C) (Tympanic)   Wt 99.8 kg (220 lb)   SpO2 99%   BMI 29.43 kg/m    Body mass index is 29.43 kg/m .  Physical Exam   GENERAL: healthy, alert, no distress and obese  NECK: no adenopathy, no asymmetry, masses, or scars and thyroid normal to palpation  RESP: lungs clear to auscultation - no rales, rhonchi or wheezes  CV: regular rate and rhythm, normal S1 S2, no S3 or S4, no murmur, click or rub, no peripheral edema and peripheral pulses strong  ABDOMEN: soft, nontender, no hepatosplenomegaly, no masses and bowel sounds normal   (male): testicular mass left superior and penis normal without urethral discharge  MS: no gross musculoskeletal defects noted, no edema  PSYCH: mentation appears normal, affect normal/bright

## 2021-12-09 ENCOUNTER — TELEPHONE (OUTPATIENT)
Dept: FAMILY MEDICINE | Facility: CLINIC | Age: 71
End: 2021-12-09
Payer: COMMERCIAL

## 2021-12-09 DIAGNOSIS — E11.9 TYPE 2 DIABETES MELLITUS WITHOUT COMPLICATION, WITHOUT LONG-TERM CURRENT USE OF INSULIN (H): Primary | ICD-10-CM

## 2021-12-09 LAB
CREAT UR-MCNC: 55 MG/DL
MICROALBUMIN UR-MCNC: 70 MG/L
MICROALBUMIN/CREAT UR: 127.27 MG/G CR (ref 0–17)

## 2021-12-09 NOTE — TELEPHONE ENCOUNTER
Patient states the FreeStyle Chuy glucose system is too expensive and  just wants the basic glucose system with test strips sent to [facility] in [facility].  Jacquelyn Lucero [facility]  2nd Floor  Primary Care

## 2021-12-09 NOTE — RESULT ENCOUNTER NOTE
Mr. Butt,    Your LDL (bad cholesterol)  was at goal. Your HDL (good cholesterol) was below my goal for you (>45 in men and > 55 in women).  Genetics and inactivity can contribute to this. Your triglycerides were above normal.  Poor diet, genetics and being overweight can contribute to this.  1000mg daily of omega-3 fatty acids may improve this.  Your A1c was above my goal for you. Your level suggest your blood sugar is in the 200 - 250 range most of the time. The goal for diabetics with other complications is less than 8. You should recheck your A1c in 3 months. In the meantime, a healthy diet high in lean protein, whole grain carbohydrates and healthy fats such as olive oil or canola will help mainain a healthy blood sugar  Your kidney function was normal.  There is a small amount of protein in your urine.  This is suggestive of early damage to your kidneys from the diabetes.  You will benefit from treatment with an ACE inhibitor which is a type of medication that lowers blood pressure and protects kidneys.    Please contact the clinic if you have additional questions.  Thank you.    Sincerely,    Liliana Sam MD

## 2021-12-10 ENCOUNTER — ANCILLARY PROCEDURE (OUTPATIENT)
Dept: ULTRASOUND IMAGING | Facility: CLINIC | Age: 71
End: 2021-12-10
Attending: FAMILY MEDICINE
Payer: COMMERCIAL

## 2021-12-10 DIAGNOSIS — N50.89 MASS OF LEFT TESTICLE: ICD-10-CM

## 2021-12-10 PROCEDURE — 76870 US EXAM SCROTUM: CPT | Performed by: RADIOLOGY

## 2021-12-10 RX ORDER — GLUCOSAMINE HCL/CHONDROITIN SU 500-400 MG
CAPSULE ORAL
Qty: 100 EACH | Refills: 3 | Status: SHIPPED | OUTPATIENT
Start: 2021-12-10

## 2021-12-10 RX ORDER — LANCETS
EACH MISCELLANEOUS
Qty: 100 EACH | Refills: 6 | Status: SHIPPED | OUTPATIENT
Start: 2021-12-10

## 2021-12-14 ENCOUNTER — TELEPHONE (OUTPATIENT)
Dept: GASTROENTEROLOGY | Facility: CLINIC | Age: 71
End: 2021-12-14
Payer: COMMERCIAL

## 2021-12-14 NOTE — TELEPHONE ENCOUNTER
Screening Questions  1. Are you active on mychart? y    2. What insurance is in the chart? [employer] care-medicare    2.  Ordering/Referring Provider: Liliana Howard MD     3. BMI 29, If greater than 40 review exclusion criteria    4.  Respiratory Screening (If yes to any of the following Hospital setting only):     Do you use daily home oxygen?   Do you have mod to severe Obstructive Sleep Apnea? n   Do you have Pulmonary Hypertension? n   Do you have UNCONTROLLED asthma? n    5. Have you had a heart or lung transplant (If yes, please review exclusion criteria) ? n    6. Are you currently on dialysis or have chronic kidney disease? n    7. Have you had a stroke or Transient ischemic attack (TIA) within 6 months? n    8. In the past 6 months, have you had any heart related issues including cardiomyopathy or heart attack? n                 If yes, did it require cardiac stenting or other implantable device      9. Do you have any implantable devices in your body (pacemaker, defib, LVAD)? n    10. Do you take nitroglycerin? If yes, how often? n    11. Are you currently taking any blood thinners?n    12. Are you a diabetic? n    13. (Females) Are you currently pregnant?   If yes, how many weeks?      15. Are you taking any prescription pain medications on a routine schedule? n If yes, MAC sedation.    16. Do you have any chemical dependencies such as alcohol, street drugs, or methadone? nIf yes, MAC sedation.    17. Do you have any history of post-traumatic stress syndrome, severe anxiety or history of psychosis? n    18. Do you transfer independently? y    19.  Do you have any issues with constipation? n    20. Preferred Pharmacy for Pre Prescription **[id number] - [place], [place] -  [address]    Scheduling Details    Which Colonoscopy Prep was Sent?mirlex  Procedure Scheduled: colon  Surgeon: gino  Date of Procedure: 1/12/22  Location:  [facility]  Caller (Please ask for phone number if not scheduled by patient): Juwan Butt      Sedation Type: CS  Conscious Sedation- Needs  for 6 hours after the procedure  MAC/General-Needs  for 24 hours after procedure    Pre-op Required at [facility] [place], [place] and OR for MAC sedation:   (if yes advise patient they will need a pre-op prior to procedure)      Is patient on blood thinners? -n (If yes- inform patient to follow up with PCP or provider for follow up instructions)     Informed patient they will need an adult  y  Cannot take any type of public or medical transportation alone    [facility] or [facility]ally: y external    Confirmed Nurse will call to complete assessment y    Additional comments: n

## 2021-12-15 NOTE — RESULT ENCOUNTER NOTE
Please call.  Ultrasound showed small cyst which is likely benign. If you would like this removed, we can have you see urology.  Please let me know.    Liliana Rossi M.D.

## 2021-12-17 DIAGNOSIS — Z11.59 ENCOUNTER FOR SCREENING FOR OTHER VIRAL DISEASES: ICD-10-CM

## 2022-01-05 RX ORDER — BISACODYL 5 MG/1
4 TABLET, DELAYED RELEASE ORAL SEE ADMIN INSTRUCTIONS
Qty: 4 TABLET | Refills: 0 | Status: SHIPPED | OUTPATIENT
Start: 2022-01-05 | End: 2022-07-18

## 2022-01-05 ASSESSMENT — MIFFLIN-ST. JEOR: SCORE: 1807.72

## 2022-01-08 ENCOUNTER — LAB (OUTPATIENT)
Dept: FAMILY MEDICINE | Facility: CLINIC | Age: 72
End: 2022-01-08
Attending: SURGERY
Payer: COMMERCIAL

## 2022-01-08 DIAGNOSIS — Z11.59 ENCOUNTER FOR SCREENING FOR OTHER VIRAL DISEASES: ICD-10-CM

## 2022-01-08 PROCEDURE — U0003 INFECTIOUS AGENT DETECTION BY NUCLEIC ACID (DNA OR RNA); SEVERE ACUTE RESPIRATORY SYNDROME CORONAVIRUS 2 (SARS-COV-2) (CORONAVIRUS DISEASE [COVID-19]), AMPLIFIED PROBE TECHNIQUE, MAKING USE OF HIGH THROUGHPUT TECHNOLOGIES AS DESCRIBED BY CMS-2020-01-R: HCPCS

## 2022-01-08 PROCEDURE — 99207 PR NO CHARGE LOS: CPT

## 2022-01-08 PROCEDURE — U0005 INFEC AGEN DETEC AMPLI PROBE: HCPCS

## 2022-01-10 LAB — SARS-COV-2 RNA RESP QL NAA+PROBE: NEGATIVE

## 2022-01-12 ENCOUNTER — HOSPITAL ENCOUNTER (OUTPATIENT)
Facility: AMBULATORY SURGERY CENTER | Age: 72
Discharge: HOME | End: 2022-01-12
Attending: SURGERY | Admitting: SURGERY
Payer: COMMERCIAL

## 2022-01-12 VITALS
OXYGEN SATURATION: 95 % | TEMPERATURE: 97.2 F | RESPIRATION RATE: 16 BRPM | WEIGHT: 218 LBS | SYSTOLIC BLOOD PRESSURE: 124 MMHG | HEIGHT: 73 IN | BODY MASS INDEX: 28.89 KG/M2 | DIASTOLIC BLOOD PRESSURE: 75 MMHG | HEART RATE: 44 BPM

## 2022-01-12 DIAGNOSIS — Z12.11 COLON CANCER SCREENING: Primary | ICD-10-CM

## 2022-01-12 LAB
COLONOSCOPY: NORMAL
GLUCOSE BLDC GLUCOMTR-MCNC: 192 MG/DL (ref 70–99)

## 2022-01-12 PROCEDURE — G8918 PT W/O PREOP ORDER IV AB PRO: HCPCS

## 2022-01-12 PROCEDURE — G0500 MOD SEDAT ENDO SERVICE >5YRS: HCPCS | Performed by: SURGERY

## 2022-01-12 PROCEDURE — 45385 COLONOSCOPY W/LESION REMOVAL: CPT | Mod: PT | Performed by: SURGERY

## 2022-01-12 PROCEDURE — 82962 GLUCOSE BLOOD TEST: CPT | Performed by: SURGERY

## 2022-01-12 PROCEDURE — 45385 COLONOSCOPY W/LESION REMOVAL: CPT

## 2022-01-12 PROCEDURE — G8907 PT DOC NO EVENTS ON DISCHARG: HCPCS

## 2022-01-12 RX ORDER — PROCHLORPERAZINE MALEATE 5 MG
5 TABLET ORAL EVERY 6 HOURS PRN
Status: CANCELLED | OUTPATIENT
Start: 2022-01-12

## 2022-01-12 RX ORDER — NALOXONE HYDROCHLORIDE 0.4 MG/ML
0.2 INJECTION, SOLUTION INTRAMUSCULAR; INTRAVENOUS; SUBCUTANEOUS
Status: CANCELLED | OUTPATIENT
Start: 2022-01-12

## 2022-01-12 RX ORDER — ONDANSETRON 2 MG/ML
4 INJECTION INTRAMUSCULAR; INTRAVENOUS EVERY 6 HOURS PRN
Status: CANCELLED | OUTPATIENT
Start: 2022-01-12

## 2022-01-12 RX ORDER — LIDOCAINE 40 MG/G
CREAM TOPICAL
Status: DISCONTINUED | OUTPATIENT
Start: 2022-01-12 | End: 2022-01-13 | Stop reason: HOSPADM

## 2022-01-12 RX ORDER — NALOXONE HYDROCHLORIDE 0.4 MG/ML
0.4 INJECTION, SOLUTION INTRAMUSCULAR; INTRAVENOUS; SUBCUTANEOUS
Status: CANCELLED | OUTPATIENT
Start: 2022-01-12

## 2022-01-12 RX ORDER — ONDANSETRON 2 MG/ML
4 INJECTION INTRAMUSCULAR; INTRAVENOUS
Status: DISCONTINUED | OUTPATIENT
Start: 2022-01-12 | End: 2022-01-13 | Stop reason: HOSPADM

## 2022-01-12 RX ORDER — FENTANYL CITRATE 50 UG/ML
INJECTION, SOLUTION INTRAMUSCULAR; INTRAVENOUS PRN
Status: DISCONTINUED | OUTPATIENT
Start: 2022-01-12 | End: 2022-01-12 | Stop reason: HOSPADM

## 2022-01-12 RX ORDER — FLUMAZENIL 0.1 MG/ML
0.2 INJECTION, SOLUTION INTRAVENOUS
Status: CANCELLED | OUTPATIENT
Start: 2022-01-12 | End: 2022-01-13

## 2022-01-12 RX ORDER — ONDANSETRON 4 MG/1
4 TABLET, ORALLY DISINTEGRATING ORAL EVERY 6 HOURS PRN
Status: CANCELLED | OUTPATIENT
Start: 2022-01-12

## 2022-01-14 LAB
PATH REPORT.COMMENTS IMP SPEC: NORMAL
PATH REPORT.FINAL DX SPEC: NORMAL
PATH REPORT.GROSS SPEC: NORMAL
PATH REPORT.MICROSCOPIC SPEC OTHER STN: NORMAL
PATH REPORT.RELEVANT HX SPEC: NORMAL
PHOTO IMAGE: NORMAL

## 2022-01-14 PROCEDURE — 88305 TISSUE EXAM BY PATHOLOGIST: CPT | Performed by: PATHOLOGY

## 2022-03-22 NOTE — TELEPHONE ENCOUNTER
Sent    Liliana Rossi M.D.    Modified Advancement Flap Text: The defect edges were debeveled with a #15 scalpel blade.  Given the location of the defect, shape of the defect and the proximity to free margins a modified advancement flap was deemed most appropriate.  Using a sterile surgical marker, an appropriate advancement flap was drawn incorporating the defect and placing the expected incisions within the relaxed skin tension lines where possible.    The area thus outlined was incised deep to adipose tissue with a #15 scalpel blade.  The skin margins were undermined to an appropriate distance in all directions utilizing iris scissors.

## 2022-03-24 ENCOUNTER — VIRTUAL VISIT (OUTPATIENT)
Dept: URGENT CARE | Facility: CLINIC | Age: 72
End: 2022-03-24
Payer: COMMERCIAL

## 2022-03-24 DIAGNOSIS — U07.1 INFECTION DUE TO 2019 NOVEL CORONAVIRUS: Primary | ICD-10-CM

## 2022-03-24 PROCEDURE — 99213 OFFICE O/P EST LOW 20 MIN: CPT | Mod: TEL | Performed by: EMERGENCY MEDICINE

## 2022-03-24 NOTE — PROGRESS NOTES
Phone appointment:    CHIEF COMPLAINT: Covid infection      HPI: Patient is a 71-year-old male whose had 3-day history of COVID symptoms, testing positive yesterday.  He has had sinus and nasal congestion, headache, body aches, and slight cough.  No shortness of breath.  He actually feels like it starting to feel better today.      ROS: See HPI otherwise normal.    No Known Allergies   Current Outpatient Medications   Medication Sig Dispense Refill     alcohol swab prep pads Use to swab area of injection/manuelito as directed. 100 each 3     bisacodyl (DULCOLAX) 5 MG EC tablet Take 4 tablets (20 mg) by mouth See Admin Instructions Take as directed.  Refer to the Split-Dose Golytely instructions provided via your REGISTRAT-MAPI. 4 tablet 0     blood glucose (NO BRAND SPECIFIED) test strip Use to test blood sugar 1 times daily or as directed. To accompany: Blood Glucose Monitor Brands: per insurance. 300 strip 1     blood glucose calibration (NO BRAND SPECIFIED) solution To accompany: Blood Glucose Monitor Brands: per insurance. 1 each 0     blood glucose monitoring (NO BRAND SPECIFIED) meter device kit Use to test blood sugar 1 times daily or as directed. Preferred blood glucose meter OR supplies to accompany: Blood Glucose Monitor Brands: per insurance. 1 kit 0     Continuous Blood Gluc Sensor (FREESTYLE MILAN 14 DAY SENSOR) MISC 1 each every 14 days Change every 14 days. 2 each 5     polyethylene glycol (GOLYTELY) 236 g suspension Take 4,000 mLs by mouth See Admin Instructions Take as directed.  Refer to the Split-Dose Golytely instructions provided via your REGISTRAT-MAPI. 4000 mL 0     thin (NO BRAND SPECIFIED) lancets Use with lanceting device. To accompany: Blood Glucose Monitor Brands: per insurance. 100 each 6         PE: No acute distress on phone appointment.  Alert.  Nondyspneic sounding speaking in full sentences.        TREATMENT: None.      ASSESSMENT: COVID symptoms of 3-day duration as noted above.  Patient feels he is  getting better and does not want to start on medications.  He is told that he has up to 10 days to get the monoclonal antibody should he feel that he is getting sicker and should reconnect by same appointment process.  Otherwise, use nonprescription over-the-counter medications for symptoms.      DIAGNOSIS: Covid infection.      PLAN: Treat symptomatically.  Reconnect within 10 days if reconsideration of monoclonal antibody.

## 2022-04-30 ENCOUNTER — HEALTH MAINTENANCE LETTER (OUTPATIENT)
Age: 72
End: 2022-04-30

## 2022-06-25 ENCOUNTER — HEALTH MAINTENANCE LETTER (OUTPATIENT)
Age: 72
End: 2022-06-25

## 2022-07-18 ENCOUNTER — OFFICE VISIT (OUTPATIENT)
Dept: FAMILY MEDICINE | Facility: CLINIC | Age: 72
End: 2022-07-18
Payer: COMMERCIAL

## 2022-07-18 VITALS
HEIGHT: 72 IN | BODY MASS INDEX: 28.61 KG/M2 | OXYGEN SATURATION: 98 % | DIASTOLIC BLOOD PRESSURE: 88 MMHG | WEIGHT: 211.2 LBS | SYSTOLIC BLOOD PRESSURE: 170 MMHG | HEART RATE: 46 BPM | RESPIRATION RATE: 13 BRPM | TEMPERATURE: 98.1 F

## 2022-07-18 DIAGNOSIS — I10 HYPERTENSION GOAL BP (BLOOD PRESSURE) < 140/90: ICD-10-CM

## 2022-07-18 DIAGNOSIS — I44.0 FIRST DEGREE AV BLOCK: ICD-10-CM

## 2022-07-18 DIAGNOSIS — Z91.199 NON COMPLIANCE WITH MEDICAL TREATMENT: ICD-10-CM

## 2022-07-18 DIAGNOSIS — Z00.00 ENCOUNTER FOR MEDICARE ANNUAL WELLNESS EXAM: Primary | ICD-10-CM

## 2022-07-18 DIAGNOSIS — E11.65 TYPE 2 DIABETES MELLITUS WITH HYPERGLYCEMIA, WITHOUT LONG-TERM CURRENT USE OF INSULIN (H): ICD-10-CM

## 2022-07-18 DIAGNOSIS — Z12.5 SCREENING PSA (PROSTATE SPECIFIC ANTIGEN): ICD-10-CM

## 2022-07-18 LAB
ANION GAP SERPL CALCULATED.3IONS-SCNC: 5 MMOL/L (ref 3–14)
BUN SERPL-MCNC: 14 MG/DL (ref 7–30)
CALCIUM SERPL-MCNC: 9.1 MG/DL (ref 8.5–10.1)
CHLORIDE BLD-SCNC: 106 MMOL/L (ref 94–109)
CHOLEST SERPL-MCNC: 176 MG/DL
CO2 SERPL-SCNC: 28 MMOL/L (ref 20–32)
CREAT SERPL-MCNC: 0.83 MG/DL (ref 0.66–1.25)
FASTING STATUS PATIENT QL REPORTED: YES
GFR SERPL CREATININE-BSD FRML MDRD: >90 ML/MIN/1.73M2
GLUCOSE BLD-MCNC: 145 MG/DL (ref 70–99)
HBA1C MFR BLD: 7.7 % (ref 0–5.6)
HDLC SERPL-MCNC: 49 MG/DL
LDLC SERPL CALC-MCNC: 102 MG/DL
NONHDLC SERPL-MCNC: 127 MG/DL
POTASSIUM BLD-SCNC: 4.4 MMOL/L (ref 3.4–5.3)
PSA SERPL-MCNC: 2.61 UG/L (ref 0–4)
SODIUM SERPL-SCNC: 139 MMOL/L (ref 133–144)
TRIGL SERPL-MCNC: 125 MG/DL

## 2022-07-18 PROCEDURE — 83036 HEMOGLOBIN GLYCOSYLATED A1C: CPT | Performed by: PHYSICIAN ASSISTANT

## 2022-07-18 PROCEDURE — 36415 COLL VENOUS BLD VENIPUNCTURE: CPT | Performed by: PHYSICIAN ASSISTANT

## 2022-07-18 PROCEDURE — 80061 LIPID PANEL: CPT | Performed by: PHYSICIAN ASSISTANT

## 2022-07-18 PROCEDURE — 80048 BASIC METABOLIC PNL TOTAL CA: CPT | Performed by: PHYSICIAN ASSISTANT

## 2022-07-18 PROCEDURE — 99207 PR FOOT EXAM NO CHARGE: CPT | Mod: 25 | Performed by: PHYSICIAN ASSISTANT

## 2022-07-18 PROCEDURE — G0103 PSA SCREENING: HCPCS | Performed by: PHYSICIAN ASSISTANT

## 2022-07-18 PROCEDURE — 99397 PER PM REEVAL EST PAT 65+ YR: CPT | Performed by: PHYSICIAN ASSISTANT

## 2022-07-18 ASSESSMENT — ENCOUNTER SYMPTOMS
NERVOUS/ANXIOUS: 0
HEMATURIA: 0
HEADACHES: 0
DIZZINESS: 0
ABDOMINAL PAIN: 0
FEVER: 0
PALPITATIONS: 0
EYE PAIN: 0
HEMATOCHEZIA: 0
MYALGIAS: 0
COUGH: 0
DYSURIA: 0
WEAKNESS: 0
SORE THROAT: 0
JOINT SWELLING: 0
PARESTHESIAS: 0
CONSTIPATION: 0
HEARTBURN: 0
FREQUENCY: 0
CHILLS: 0
DIARRHEA: 0
ARTHRALGIAS: 0
NAUSEA: 0
SHORTNESS OF BREATH: 0

## 2022-07-18 ASSESSMENT — PAIN SCALES - GENERAL: PAINLEVEL: NO PAIN (0)

## 2022-07-18 ASSESSMENT — ACTIVITIES OF DAILY LIVING (ADL): CURRENT_FUNCTION: NO ASSISTANCE NEEDED

## 2022-07-18 NOTE — PATIENT INSTRUCTIONS
Patient Education   Personalized Prevention Plan  You are due for the preventive services outlined below.  Your care team is available to assist you in scheduling these services.  If you have already completed any of these items, please share that information with your care team to update in your medical record.  Health Maintenance Due   Topic Date Due     Diabetic Foot Exam  Never done     Eye Exam  Never done     COVID-19 Vaccine (1) Never done     Pneumococcal Vaccine (1 - PCV) Never done     Zoster (Shingles) Vaccine (1 of 2) Never done     LUNG CANCER SCREENING  Never done     AORTIC ANEURYSM SCREENING (SYSTEM ASSIGNED)  Never done     [facility] Lab  06/08/2022

## 2022-07-18 NOTE — PROGRESS NOTES
"SUBJECTIVE:   Juwan Butt is a 71 year old male who presents for Preventive Visit.      Patient has been advised of split billing requirements and indicates understanding: Yes  Are you in the first 12 months of your Medicare coverage?  No    Healthy Habits:     In general, how would you rate your overall health?  Excellent    Frequency of exercise:  4-5 days/week    Duration of exercise:  30-45 minutes    Do you usually eat at least 4 servings of fruit and vegetables a day, include whole grains    & fiber and avoid regularly eating high fat or \"junk\" foods?  Yes    Taking medications regularly:  Not Applicable    Medication side effects:  Not applicable    Ability to successfully perform activities of daily living:  No assistance needed    Home Safety:  Throw rugs in the hallway and lack of grab bars in the bathroom    Hearing Impairment:  No hearing concerns    In the past 6 months, have you been bothered by leaking of urine?  No    In general, how would you rate your overall mental or emotional health?  Excellent      PHQ-2 Total Score: 0    Additional concerns today:  Yes    Do you feel safe in your environment? Yes    Have you ever done Advance Care Planning? (For example, a Health Directive, POLST, or a discussion with a medical provider or your loved ones about your wishes): No, advance care planning information given to patient to review.  Patient plans to discuss their wishes with loved ones or provider.         Fall risk  Fallen 2 or more times in the past year?: No  Any fall with injury in the past year?: No    Cognitive Screening   1) Repeat 3 items (Leader, Season, Table)    2) Clock draw: NORMAL  3) 3 item recall: Recalls 2 objects   Results: NORMAL clock, 1-2 items recalled: COGNITIVE IMPAIRMENT LESS LIKELY    Mini-CogTM Copyright FREDERIC Lopez. Licensed by the author for use in [facility]; reprinted with permission (vania@.[email or web address]). All rights reserved.      Do you have sleep apnea, excessive " "snoring or daytime drowsiness?: no    Reviewed and updated as needed this visit by clinical staff   Tobacco  Allergies  Meds  Problems  Med Hx  Surg Hx  Fam Hx  Soc   Hx          Reviewed and updated as needed this visit by Provider   Tobacco  Allergies  Meds  Problems  Med Hx  Surg Hx  Fam Hx           Social History     Tobacco Use     Smoking status: Former Smoker     Packs/day: 1.00     Years: 20.00     Pack years: 20.00     Types: Cigarettes     Smokeless tobacco: Never Used     Tobacco comment: quit smoking 30 yr ago   Substance Use Topics     Alcohol use: Yes     Alcohol/week: 0.0 standard drinks     Comment: occasional when going out for dinner     If you drink alcohol do you typically have >3 drinks per day or >7 drinks per week? No    Alcohol Use 7/18/2022   Prescreen: >3 drinks/day or >7 drinks/week? No   Prescreen: >3 drinks/day or >7 drinks/week? -   No flowsheet data found.        Diabetes Follow-up - Foot Exam     How often are you checking your blood sugar? One time daily  What time of day are you checking your blood sugars (select all that apply)?  Before meals  Have you had any blood sugars above 200?  Yes - \"its been a long time\"   Have you had any blood sugars below 70?  Yes     What symptoms do you notice when your blood sugar is low?  None and not sure     What concerns do you have today about your diabetes? None     Do you have any of these symptoms? (Select all that apply)  No numbness or tingling in feet.  No redness, sores or blisters on feet.  No complaints of excessive thirst.  No reports of blurry vision.  No significant changes to weight.    Have you had a diabetic eye exam in the last 12 months? No        BP Readings from Last 2 Encounters:   07/18/22 (!) 170/88   01/12/22 124/75     Hemoglobin A1C POCT (%)   Date Value   11/07/2018 7.8 (H)   08/17/2017 6.7 (H)     Hemoglobin A1C (%)   Date Value   12/08/2021 11.1 (H)     LDL Cholesterol Calculated (mg/dL)   Date Value "   12/08/2021 109 (H)   11/07/2018 79   07/13/2015 98       Current providers sharing in care for this patient include:   Patient Care Team:  Liliana Howard MD as PCP - General (Family Practice)  Liliana Howard MD as Assigned PCP  Jude Jean-Baptiste DPM as Assigned Musculoskeletal Provider    The following health maintenance items are reviewed in Epic and correct as of today:  Health Maintenance Due   Topic Date Due     EYE EXAM  Never done     COVID-19 Vaccine (1) Never done     Pneumococcal Vaccine: 65+ Years (1 - PCV) Never done     ZOSTER IMMUNIZATION (1 of 2) Never done     LUNG CANCER SCREENING  Never done     AORTIC ANEURYSM SCREENING (SYSTEM ASSIGNED)  Never done     A1C  06/08/2022     Patient Active Problem List   Diagnosis     Unilateral inguinal hernia without obstruction or gangrene, recurrence not specified     CARDIOVASCULAR SCREENING; LDL GOAL LESS THAN 160     Advance Care Planning     First degree AV block     Prediabetes     C. difficile diarrhea     Rectal bleeding     Elevated prostate specific antigen (PSA)     Other male erectile dysfunction     Type 2 diabetes mellitus with hyperglycemia, without long-term current use of insulin (H)     Pneumonia due to COVID-19 virus     Hyperlipidemia with target low density lipoprotein (LDL) cholesterol less than 100 mg/dL     Hypertension goal BP (blood pressure) < 140/90     Non compliance with medical treatment     Past Surgical History:   Procedure Laterality Date     COLONOSCOPY  2006     COLONOSCOPY N/A 1/12/2022    Procedure: COLONOSCOPY, FLEXIBLE, WITH LESION REMOVAL USING SNARE;  Surgeon: Montana Gómez DO;  Location: [facility] OR     COLONOSCOPY WITH CO2 INSUFFLATION N/A 8/29/2016    Procedure: COLONOSCOPY WITH CO2 INSUFFLATION;  Surgeon: Tom Odom MD;  Location: [facility] OR     COLONOSCOPY WITH CO2 INSUFFLATION N/A 1/12/2022    Procedure: COLONOSCOPY, WITH CO2 INSUFFLATION;  Surgeon: Montana Gómez DO;   Location: [facility] OR     HERNIORRHAPHY INGUINAL Right 1/13/2016    Procedure: HERNIORRHAPHY INGUINAL;  Surgeon: Tom Odom MD;  Location: [facility] OR     TRANSANAL MINIMALLY INVASIVE SURGERY N/A 12/7/2016    Procedure: TRANSANAL MINIMALLY INVASIVE SURGERY;  Surgeon: Jaron Alan MD;  Location: [facility] OR       Social History     Tobacco Use     Smoking status: Former Smoker     Packs/day: 1.00     Years: 20.00     Pack years: 20.00     Types: Cigarettes     Smokeless tobacco: Never Used     Tobacco comment: quit smoking 30 yr ago   Substance Use Topics     Alcohol use: Yes     Alcohol/week: 0.0 standard drinks     Comment: occasional when going out for dinner     Family History   Problem Relation Age of Onset     Other Cancer Father 76        esophagus     Hypertension Mother      Osteoporosis Mother          Current Outpatient Medications   Medication Sig Dispense Refill     alcohol swab prep pads Use to swab area of injection/manuelito as directed. 100 each 3     blood glucose (NO BRAND SPECIFIED) test strip Use to test blood sugar 1 times daily or as directed. To accompany: Blood Glucose Monitor Brands: per insurance. 300 strip 1     blood glucose calibration (NO BRAND SPECIFIED) solution To accompany: Blood Glucose Monitor Brands: per insurance. 1 each 0     blood glucose monitoring (NO BRAND SPECIFIED) meter device kit Use to test blood sugar 1 times daily or as directed. Preferred blood glucose meter OR supplies to accompany: Blood Glucose Monitor Brands: per insurance. 1 kit 0     thin (NO BRAND SPECIFIED) lancets Use with lanceting device. To accompany: Blood Glucose Monitor Brands: per insurance. 100 each 6     No Known Allergies          Review of Systems   Constitutional: Negative for chills and fever.   HENT: Negative for congestion, ear pain, hearing loss and sore throat.    Eyes: Negative for pain and visual disturbance.   Respiratory: Negative for cough and shortness of breath.    Cardiovascular: Negative  "for chest pain, palpitations and peripheral edema.   Gastrointestinal: Negative for abdominal pain, constipation, diarrhea, heartburn, hematochezia and nausea.   Genitourinary: Negative for dysuria, frequency, genital sores, hematuria, impotence, penile discharge and urgency.   Musculoskeletal: Negative for arthralgias, joint swelling and myalgias.   Skin: Negative for rash.   Neurological: Negative for dizziness, weakness, headaches and paresthesias.   Psychiatric/Behavioral: Negative for mood changes. The patient is not nervous/anxious.      Constitutional, HEENT, cardiovascular, pulmonary, gi and gu systems are negative, except as otherwise noted.    OBJECTIVE:   BP (!) 170/88   Pulse (!) 46   Temp 98.1  F (36.7  C) (Tympanic)   Resp 13   Ht 1.835 m (6' 0.25\")   Wt 95.8 kg (211 lb 3.2 oz)   SpO2 98%   BMI 28.45 kg/m   Estimated body mass index is 28.45 kg/m  as calculated from the following:    Height as of this encounter: 1.835 m (6' 0.25\").    Weight as of this encounter: 95.8 kg (211 lb 3.2 oz).     Physical Exam  GENERAL: healthy, alert and no distress  EYES: Eyes grossly normal to inspection, PERRL and conjunctivae and sclerae normal  HENT: ear canals and TM's normal, nose and mouth without ulcers or lesions  NECK: no adenopathy, no asymmetry, masses, or scars and thyroid normal to palpation  RESP: lungs clear to auscultation - no rales, rhonchi or wheezes  CV: regular rate and rhythm, normal S1 S2, no S3 or S4, no murmur, click or rub, no peripheral edema and peripheral pulses strong  ABDOMEN: soft, nontender, no hepatosplenomegaly, no masses and bowel sounds normal  MS: no gross musculoskeletal defects noted, no edema  SKIN: no suspicious lesions or rashes  NEURO: Normal strength and tone, mentation intact and speech normal  PSYCH: mentation appears normal, affect normal/bright  Diabetic foot exam: normal DP and PT pulses, no trophic changes or ulcerative lesions and normal sensory " "exam    Diagnostic Test Results:  Labs reviewed in Epic    ASSESSMENT / PLAN:   Juwan was seen today for physical and diabetes.    Diagnoses and all orders for this visit:    Encounter for Medicare annual wellness exam    Hypertension goal BP (blood pressure) < 140/90    Screening PSA (prostate specific antigen)  -     PSA, screen; Future  -     PSA, screen    First degree AV block    Type 2 diabetes mellitus with hyperglycemia, without long-term current use of insulin (H)  -     OPTOMETRY REFERRAL; Future  -     HEMOGLOBIN A1C; Future  -     Lipid Profile (Chol, Trig, HDL, LDL calc); Future  -     Basic metabolic panel  (Ca, Cl, CO2, Creat, Gluc, K, Na, BUN); Future  -     HEMOGLOBIN A1C  -     Lipid Profile (Chol, Trig, HDL, LDL calc)  -     Basic metabolic panel  (Ca, Cl, CO2, Creat, Gluc, K, Na, BUN)    Non compliance with medical treatment      BP-uncontrolled. Patient declined to start a medication   Discussed the dangers of uncontrolled HTN and DM that include-eye, kidney , brain tissue damage as well as blood vessels and heart that can lead to stroke, heart failure, kidney failure and blindness . Patient reports that he understands the dangers, but is not willing to start treatment that includes medications   Patient is working of improving diet and weight loss    Bradycardia   First degree AV block -patient does not wish to follow up with cardiology at this time. Patient reports that this is a lifelong issue that has been stable  Last echo was July 2021-stable     Patient has been advised of split billing requirements and indicates understanding: Yes    COUNSELING:  Reviewed preventive health counseling, as reflected in patient instructions       Regular exercise       Healthy diet/nutrition       Immunizations    Declined: Pneumococcal and Zoster             Estimated body mass index is 28.45 kg/m  as calculated from the following:    Height as of this encounter: 1.835 m (6' 0.25\").    Weight as of this " encounter: 95.8 kg (211 lb 3.2 oz).    Weight management plan: Discussed healthy diet and exercise guidelines    He reports that he has quit smoking. His smoking use included cigarettes. He has a 20.00 pack-year smoking history. He has never used smokeless tobacco.      Appropriate preventive services were discussed with this patient, including applicable screening as appropriate for cardiovascular disease, diabetes, osteopenia/osteoporosis, and glaucoma.  As appropriate for age/gender, discussed screening for colorectal cancer, prostate cancer, breast cancer, and cervical cancer. Checklist reviewing preventive services available has been given to the patient.    Reviewed patients plan of care and provided an AVS. The Basic Care Plan (routine screening as documented in Health Maintenance) for Juwan meets the Care Plan requirement. This Care Plan has been established and reviewed with the Patient.    Counseling Resources:  ATP IV Guidelines  Pooled Cohorts Equation Calculator  Breast Cancer Risk Calculator  Breast Cancer: Medication to Reduce Risk  FRAX Risk Assessment  ICSI Preventive Guidelines  Dietary Guidelines for Americans, 2010  USDA's MyPlate  ASA Prophylaxis  Lung CA Screening    Tomasa Washington PA-C  [facility]    Identified Health Risks:

## 2022-11-19 ENCOUNTER — HEALTH MAINTENANCE LETTER (OUTPATIENT)
Age: 72
End: 2022-11-19

## 2022-11-30 ENCOUNTER — TELEPHONE (OUTPATIENT)
Dept: FAMILY MEDICINE | Facility: CLINIC | Age: 72
End: 2022-11-30

## 2022-11-30 ENCOUNTER — TELEPHONE (OUTPATIENT)
Dept: FAMILY MEDICINE | Facility: CLINIC | Age: 72
End: 2022-11-30
Payer: COMMERCIAL

## 2022-11-30 NOTE — TELEPHONE ENCOUNTER
Patient Quality Outreach    Patient is due for the following:   Hypertension -  BP check    Next Steps:   Schedule a nurse only visit for blood pressure check    Type of outreach:    Sent [employer] message.      Questions for provider review:    None     Diane L. Schoenherr, RN

## 2023-04-15 ENCOUNTER — HEALTH MAINTENANCE LETTER (OUTPATIENT)
Age: 73
End: 2023-04-15

## 2023-09-10 ENCOUNTER — HEALTH MAINTENANCE LETTER (OUTPATIENT)
Age: 73
End: 2023-09-10

## 2023-11-19 ENCOUNTER — HEALTH MAINTENANCE LETTER (OUTPATIENT)
Age: 73
End: 2023-11-19

## 2024-06-16 ENCOUNTER — HEALTH MAINTENANCE LETTER (OUTPATIENT)
Age: 74
End: 2024-06-16

## 2024-11-03 ENCOUNTER — HEALTH MAINTENANCE LETTER (OUTPATIENT)
Age: 74
End: 2024-11-03

## 2024-12-29 ENCOUNTER — HEALTH MAINTENANCE LETTER (OUTPATIENT)
Age: 74
End: 2024-12-29

## 2025-03-26 NOTE — TELEPHONE ENCOUNTER
Patient has a hospital visit svheduled for 8/18/2021 with Dr Apolinar Sam. Placed forms in Dr Apolinar Sam's red folder.  Jacquelyn Lucero [facility]  2nd Floor  Primary Care     lower leg pain/injury

## (undated) DEVICE — SOL WATER IRRIG 1000ML BOTTLE 07139-09

## (undated) RX ORDER — FENTANYL CITRATE 50 UG/ML
INJECTION, SOLUTION INTRAMUSCULAR; INTRAVENOUS
Status: DISPENSED
Start: 2022-01-12